# Patient Record
Sex: FEMALE | Race: BLACK OR AFRICAN AMERICAN | URBAN - METROPOLITAN AREA
[De-identification: names, ages, dates, MRNs, and addresses within clinical notes are randomized per-mention and may not be internally consistent; named-entity substitution may affect disease eponyms.]

---

## 2023-10-12 ENCOUNTER — OFFICE VISIT (OUTPATIENT)
Age: 73
End: 2023-10-12
Payer: MEDICARE

## 2023-10-12 VITALS
BODY MASS INDEX: 26.97 KG/M2 | OXYGEN SATURATION: 98 % | DIASTOLIC BLOOD PRESSURE: 72 MMHG | SYSTOLIC BLOOD PRESSURE: 119 MMHG | TEMPERATURE: 98.1 F | HEART RATE: 109 BPM | HEIGHT: 57 IN | WEIGHT: 125 LBS | RESPIRATION RATE: 18 BRPM

## 2023-10-12 DIAGNOSIS — Z86.73 HISTORY OF RECENT STROKE: ICD-10-CM

## 2023-10-12 DIAGNOSIS — F32.0 CURRENT MILD EPISODE OF MAJOR DEPRESSIVE DISORDER, UNSPECIFIED WHETHER RECURRENT (HCC): ICD-10-CM

## 2023-10-12 DIAGNOSIS — Z79.4 TYPE 2 DIABETES MELLITUS WITH DIABETIC POLYNEUROPATHY, WITH LONG-TERM CURRENT USE OF INSULIN (HCC): ICD-10-CM

## 2023-10-12 DIAGNOSIS — E11.59 TYPE 2 DIABETES MELLITUS WITH OTHER CIRCULATORY COMPLICATION, WITH LONG-TERM CURRENT USE OF INSULIN (HCC): ICD-10-CM

## 2023-10-12 DIAGNOSIS — Z76.89 ESTABLISHING CARE WITH NEW DOCTOR, ENCOUNTER FOR: Primary | ICD-10-CM

## 2023-10-12 DIAGNOSIS — I10 PRIMARY HYPERTENSION: ICD-10-CM

## 2023-10-12 DIAGNOSIS — E11.42 TYPE 2 DIABETES MELLITUS WITH DIABETIC POLYNEUROPATHY, WITH LONG-TERM CURRENT USE OF INSULIN (HCC): ICD-10-CM

## 2023-10-12 DIAGNOSIS — Z79.4 TYPE 2 DIABETES MELLITUS WITH OTHER CIRCULATORY COMPLICATION, WITH LONG-TERM CURRENT USE OF INSULIN (HCC): ICD-10-CM

## 2023-10-12 PROCEDURE — 99204 OFFICE O/P NEW MOD 45 MIN: CPT

## 2023-10-12 RX ORDER — CLOPIDOGREL BISULFATE 75 MG/1
TABLET ORAL
COMMUNITY

## 2023-10-12 RX ORDER — BUPROPION HYDROCHLORIDE 300 MG/1
TABLET ORAL
COMMUNITY

## 2023-10-12 RX ORDER — ASPIRIN 81 MG/1
TABLET ORAL
COMMUNITY

## 2023-10-12 RX ORDER — INSULIN LISPRO 100 [IU]/ML
INJECTION, SOLUTION INTRAVENOUS; SUBCUTANEOUS
COMMUNITY
Start: 2013-09-12

## 2023-10-12 RX ORDER — ATORVASTATIN CALCIUM 80 MG/1
TABLET, FILM COATED ORAL
COMMUNITY

## 2023-10-12 RX ORDER — LORAZEPAM 0.5 MG/1
TABLET ORAL
COMMUNITY
Start: 2023-01-10

## 2023-10-12 RX ORDER — CALCIUM CARBONATE 300MG(750)
TABLET,CHEWABLE ORAL
COMMUNITY

## 2023-10-12 RX ORDER — DULOXETIN HYDROCHLORIDE 60 MG/1
CAPSULE, DELAYED RELEASE ORAL
COMMUNITY

## 2023-10-12 RX ORDER — DULOXETIN HYDROCHLORIDE 30 MG/1
30 CAPSULE, DELAYED RELEASE ORAL DAILY
COMMUNITY

## 2023-10-12 RX ORDER — PRAVASTATIN SODIUM 10 MG
TABLET ORAL
COMMUNITY
End: 2023-10-14 | Stop reason: ALTCHOICE

## 2023-10-12 RX ORDER — LANCETS
EACH MISCELLANEOUS
COMMUNITY
Start: 2014-01-22

## 2023-10-12 RX ORDER — LISINOPRIL 40 MG/1
TABLET ORAL
COMMUNITY

## 2023-10-12 SDOH — ECONOMIC STABILITY: FOOD INSECURITY: WITHIN THE PAST 12 MONTHS, THE FOOD YOU BOUGHT JUST DIDN'T LAST AND YOU DIDN'T HAVE MONEY TO GET MORE.: NEVER TRUE

## 2023-10-12 SDOH — ECONOMIC STABILITY: INCOME INSECURITY: HOW HARD IS IT FOR YOU TO PAY FOR THE VERY BASICS LIKE FOOD, HOUSING, MEDICAL CARE, AND HEATING?: NOT HARD AT ALL

## 2023-10-12 SDOH — ECONOMIC STABILITY: HOUSING INSECURITY
IN THE LAST 12 MONTHS, WAS THERE A TIME WHEN YOU DID NOT HAVE A STEADY PLACE TO SLEEP OR SLEPT IN A SHELTER (INCLUDING NOW)?: NO

## 2023-10-12 SDOH — ECONOMIC STABILITY: FOOD INSECURITY: WITHIN THE PAST 12 MONTHS, YOU WORRIED THAT YOUR FOOD WOULD RUN OUT BEFORE YOU GOT MONEY TO BUY MORE.: NEVER TRUE

## 2023-10-12 ASSESSMENT — PATIENT HEALTH QUESTIONNAIRE - PHQ9
SUM OF ALL RESPONSES TO PHQ9 QUESTIONS 1 & 2: 2
SUM OF ALL RESPONSES TO PHQ QUESTIONS 1-9: 2
SUM OF ALL RESPONSES TO PHQ QUESTIONS 1-9: 2
2. FEELING DOWN, DEPRESSED OR HOPELESS: 1
SUM OF ALL RESPONSES TO PHQ QUESTIONS 1-9: 2
1. LITTLE INTEREST OR PLEASURE IN DOING THINGS: 1
SUM OF ALL RESPONSES TO PHQ QUESTIONS 1-9: 2

## 2023-12-13 ENCOUNTER — TELEPHONE (OUTPATIENT)
Age: 73
End: 2023-12-13

## 2023-12-13 NOTE — TELEPHONE ENCOUNTER
I called the patient to schedule their appointment for January but there was no answer.  I left a voicemail for the patient to call us back to schedule the next appointment.    ----- Message from Noelle Mijares sent at 10/12/2023  6:01 PM EDT -----  Regarding: 3 mos follow up  January appt with Dr. Jose Lund

## 2024-03-11 ENCOUNTER — ANESTHESIA EVENT (OUTPATIENT)
Facility: HOSPITAL | Age: 74
End: 2024-03-11
Payer: MEDICARE

## 2024-03-11 ENCOUNTER — ANESTHESIA (OUTPATIENT)
Facility: HOSPITAL | Age: 74
End: 2024-03-11
Payer: MEDICARE

## 2024-03-11 ENCOUNTER — HOSPITAL ENCOUNTER (OUTPATIENT)
Facility: HOSPITAL | Age: 74
Setting detail: OUTPATIENT SURGERY
Discharge: HOME | End: 2024-03-11
Attending: INTERNAL MEDICINE | Admitting: SPECIALIST
Payer: MEDICARE

## 2024-03-11 VITALS
HEIGHT: 59 IN | SYSTOLIC BLOOD PRESSURE: 163 MMHG | OXYGEN SATURATION: 99 % | RESPIRATION RATE: 16 BRPM | BODY MASS INDEX: 24.84 KG/M2 | HEART RATE: 71 BPM | TEMPERATURE: 98 F | WEIGHT: 123.24 LBS | DIASTOLIC BLOOD PRESSURE: 82 MMHG

## 2024-03-11 LAB
GLUCOSE BLD STRIP.AUTO-MCNC: 212 MG/DL (ref 65–117)
SERVICE CMNT-IMP: ABNORMAL

## 2024-03-11 PROCEDURE — 3600007502: Performed by: INTERNAL MEDICINE

## 2024-03-11 PROCEDURE — 3700000000 HC ANESTHESIA ATTENDED CARE: Performed by: INTERNAL MEDICINE

## 2024-03-11 PROCEDURE — 6360000002 HC RX W HCPCS: Performed by: NURSE ANESTHETIST, CERTIFIED REGISTERED

## 2024-03-11 PROCEDURE — 3700000001 HC ADD 15 MINUTES (ANESTHESIA): Performed by: INTERNAL MEDICINE

## 2024-03-11 PROCEDURE — 2580000003 HC RX 258: Performed by: NURSE ANESTHETIST, CERTIFIED REGISTERED

## 2024-03-11 PROCEDURE — 7100000010 HC PHASE II RECOVERY - FIRST 15 MIN: Performed by: INTERNAL MEDICINE

## 2024-03-11 PROCEDURE — 2709999900 HC NON-CHARGEABLE SUPPLY: Performed by: INTERNAL MEDICINE

## 2024-03-11 PROCEDURE — 7100000011 HC PHASE II RECOVERY - ADDTL 15 MIN: Performed by: INTERNAL MEDICINE

## 2024-03-11 PROCEDURE — 88305 TISSUE EXAM BY PATHOLOGIST: CPT

## 2024-03-11 PROCEDURE — 82962 GLUCOSE BLOOD TEST: CPT

## 2024-03-11 PROCEDURE — 3600007512: Performed by: INTERNAL MEDICINE

## 2024-03-11 PROCEDURE — 2500000003 HC RX 250 WO HCPCS: Performed by: NURSE ANESTHETIST, CERTIFIED REGISTERED

## 2024-03-11 RX ORDER — SODIUM CHLORIDE 0.9 % (FLUSH) 0.9 %
5-40 SYRINGE (ML) INJECTION EVERY 12 HOURS SCHEDULED
Status: DISCONTINUED | OUTPATIENT
Start: 2024-03-11 | End: 2024-03-11 | Stop reason: HOSPADM

## 2024-03-11 RX ORDER — SODIUM CHLORIDE 9 MG/ML
INJECTION, SOLUTION INTRAVENOUS CONTINUOUS PRN
Status: DISCONTINUED | OUTPATIENT
Start: 2024-03-11 | End: 2024-03-11 | Stop reason: SDUPTHER

## 2024-03-11 RX ORDER — PROPOFOL 10 MG/ML
INJECTION, EMULSION INTRAVENOUS CONTINUOUS PRN
Status: DISCONTINUED | OUTPATIENT
Start: 2024-03-11 | End: 2024-03-11 | Stop reason: SDUPTHER

## 2024-03-11 RX ORDER — LIDOCAINE HCL/PF 100 MG/5ML
SYRINGE (ML) INJECTION PRN
Status: DISCONTINUED | OUTPATIENT
Start: 2024-03-11 | End: 2024-03-11 | Stop reason: SDUPTHER

## 2024-03-11 RX ORDER — SODIUM CHLORIDE 0.9 % (FLUSH) 0.9 %
5-40 SYRINGE (ML) INJECTION PRN
Status: DISCONTINUED | OUTPATIENT
Start: 2024-03-11 | End: 2024-03-11 | Stop reason: HOSPADM

## 2024-03-11 RX ORDER — SODIUM CHLORIDE 9 MG/ML
25 INJECTION, SOLUTION INTRAVENOUS PRN
Status: DISCONTINUED | OUTPATIENT
Start: 2024-03-11 | End: 2024-03-11 | Stop reason: HOSPADM

## 2024-03-11 RX ORDER — ONDANSETRON 2 MG/ML
4 INJECTION INTRAMUSCULAR; INTRAVENOUS EVERY 6 HOURS PRN
Status: DISCONTINUED | OUTPATIENT
Start: 2024-03-11 | End: 2024-03-11 | Stop reason: HOSPADM

## 2024-03-11 RX ORDER — SODIUM CHLORIDE 9 MG/ML
INJECTION, SOLUTION INTRAVENOUS PRN
Status: DISCONTINUED | OUTPATIENT
Start: 2024-03-11 | End: 2024-03-11 | Stop reason: HOSPADM

## 2024-03-11 RX ORDER — ONDANSETRON 4 MG/1
4 TABLET, ORALLY DISINTEGRATING ORAL EVERY 8 HOURS PRN
Status: DISCONTINUED | OUTPATIENT
Start: 2024-03-11 | End: 2024-03-11 | Stop reason: HOSPADM

## 2024-03-11 RX ADMIN — PROPOFOL 70 MG: 10 INJECTION, EMULSION INTRAVENOUS at 07:42

## 2024-03-11 RX ADMIN — SODIUM CHLORIDE: 9 INJECTION, SOLUTION INTRAVENOUS at 07:38

## 2024-03-11 RX ADMIN — PROPOFOL 120 MCG/KG/MIN: 10 INJECTION, EMULSION INTRAVENOUS at 07:43

## 2024-03-11 RX ADMIN — LIDOCAINE HYDROCHLORIDE 40 MG: 20 INJECTION INTRAVENOUS at 07:42

## 2024-03-11 RX ADMIN — LIDOCAINE HYDROCHLORIDE 40 MG: 20 INJECTION, SOLUTION INFILTRATION; PERINEURAL at 07:43

## 2024-03-11 ASSESSMENT — PAIN - FUNCTIONAL ASSESSMENT: PAIN_FUNCTIONAL_ASSESSMENT: 0-10

## 2024-03-11 NOTE — ANESTHESIA PRE PROCEDURE
facility-administered medications for this encounter.       Allergies:    Allergies   Allergen Reactions    Spironolactone Swelling     Mouth swelling    Elemental Sulfur Rash       Problem List:  There is no problem list on file for this patient.      Past Medical History:        Diagnosis Date    Cerebral artery occlusion with cerebral infarction (HCC)     Diabetes mellitus (HCC)     Hypertension        Past Surgical History:        Procedure Laterality Date     SECTION      PARTIAL HYSTERECTOMY (CERVIX NOT REMOVED)         Social History:    Social History     Tobacco Use    Smoking status: Never    Smokeless tobacco: Never   Substance Use Topics    Alcohol use: Never                                Counseling given: Not Answered      Vital Signs (Current):   Vitals:    24 0642   BP: (!) 170/68   Pulse: 71   Resp: 16   Temp: 98.2 °F (36.8 °C)   TempSrc: Oral   SpO2: 97%   Weight: 55.9 kg (123 lb 3.8 oz)   Height: 1.499 m (4' 11\")                                              BP Readings from Last 3 Encounters:   24 (!) 170/68   10/12/23 119/72       NPO Status: Time of last liquid consumption: 1900                        Time of last solid consumption: 1600                        Date of last liquid consumption: 03/10/24                        Date of last solid food consumption: 24    BMI:   Wt Readings from Last 3 Encounters:   24 55.9 kg (123 lb 3.8 oz)   10/12/23 56.7 kg (125 lb)     Body mass index is 24.89 kg/m².    CBC: No results found for: \"WBC\", \"RBC\", \"HGB\", \"HCT\", \"MCV\", \"RDW\", \"PLT\"    CMP: No results found for: \"NA\", \"K\", \"CL\", \"CO2\", \"BUN\", \"CREATININE\", \"GFRAA\", \"AGRATIO\", \"LABGLOM\", \"GLUCOSE\", \"GLU\", \"PROT\", \"CALCIUM\", \"BILITOT\", \"ALKPHOS\", \"AST\", \"ALT\"    POC Tests:   Recent Labs     24  0640   POCGLU 212*       Coags: No results found for: \"PROTIME\", \"INR\", \"APTT\"    HCG (If Applicable): No results found for: \"PREGTESTUR\", \"PREGSERUM\", \"HCG\", \"HCGQUANT\"

## 2024-03-11 NOTE — OP NOTE
.                       [facility]  Formerly [facility]  Pollo Ellison MD  [phone]      2024    Colonoscopy Procedure Note  Crystal Carrillo  :  1950  [facility] Medical Record Number: [id number]    Indications:   Polyp surveillance, current constipation, last colonoscopy 5 years ago  PCP:  Tammy Sifuentes MD  Anesthesia/Sedation: Monitored anesthesia care, see separate note  Endoscopist:  Pollo Ellison MD   Complications:  None  Estimated Blood Loss:  None    Permit:  The indications, risks, benefits and alternatives were reviewed with the patient or their decision maker who was provided an opportunity to ask questions and all questions were answered.  The specific risks of colonoscopy with conscious sedation were reviewed, including but not limited to anesthetic complication, bleeding, adverse drug reaction, missed lesion, infection, IV site reactions, and intestinal perforation which would lead to the need for surgical repair.  Alternatives to colonoscopy including radiographic imaging, observation without testing, or laboratory testing were reviewed including the limitations of those alternatives.  After considering the options and having all their questions answered, the patient or their decision maker provided both verbal and written consent to proceed.        Procedure in Detail:  After obtaining informed consent, positioning of the patient in the left lateral decubitus position, and conduction of a pre-procedure pause or \"time out\" the endoscope was introduced into the anus and advanced to the cerum.  The quality of the colonic preparation was fair.  A careful inspection was made as the colonoscope was withdrawn, findings and interventions are described below.  After cleaning the colon, the [place] Bowel Prep score:   Right colon-2  Transverse colon-2, left colon-2  Total [place] bowel score of 6, fair prep    Findings:   Digital rectal

## 2024-03-11 NOTE — PROGRESS NOTES
Endoscopy discharge instructions have been reviewed and given to patient.  The patient verbalized understanding and acceptance of instructions.      Dr. Palomo discussed with spouse procedure findings and next steps.

## 2024-03-11 NOTE — H&P
.Pre-Endoscopy H&P Update  Chief complaint/HPI/ROS:  The indication for the procedure, the patient's history and the patient's current medications are reviewed prior to the procedure and that data is reported on the H&P to which this document is attached.  Any significant complaints with regard to organ systems will be noted, and if not mentioned then a review of systems is not contributory.  Past Medical History:   Diagnosis Date    Cerebral artery occlusion with cerebral infarction (HCC)     Diabetes mellitus (HCC)     Hypertension       Past Surgical History:   Procedure Laterality Date     SECTION      PARTIAL HYSTERECTOMY (CERVIX NOT REMOVED)       Social   Social History     Tobacco Use    Smoking status: Never    Smokeless tobacco: Never   Substance Use Topics    Alcohol use: Never      History reviewed. No pertinent family history.   Allergies   Allergen Reactions    Spironolactone Swelling     Mouth swelling    Elemental Sulfur Rash      Prior to Admission Medications   Prescriptions Last Dose Informant Patient Reported? Taking?   Cholecalciferol (VITAMIN D3) 25 MCG (1000 UT) CHEW Past Month  Yes No   Sig: Take by mouth   DULoxetine (CYMBALTA) 30 MG extended release capsule 3/8/2024  Yes No   Sig: Take 1 capsule by mouth daily   DULoxetine (CYMBALTA) 60 MG extended release capsule 3/8/2024  Yes No   Si MG  , TAKE 1 CAPSULE DAILY   LORazepam (ATIVAN) 0.5 MG tablet Past Month  Yes No   Sig: Take by mouth.   ONE TOUCH CLUB LANCETS MISC 3/10/2024  Yes No   Sig: Not Applicable  TEST THREE TIMES A DAY, as directed   Semaglutide (OZEMPIC, 1 MG/DOSE, SC) 3/3/2024  Yes No   Sig: Inject into the skin   aspirin 81 MG EC tablet Not Taking  Yes No   Sig: Take by mouth   Patient not taking: Reported on 10/12/2023   atorvastatin (LIPITOR) 80 MG tablet 3/8/2024  Yes No   Sig: Take by mouth   blood glucose test strips (ASCENSIA AUTODISC VI;ONE TOUCH ULTRA TEST VI) strip 3/10/2024  Yes No   Si each by In

## 2024-03-11 NOTE — ANESTHESIA POSTPROCEDURE EVALUATION
Department of Anesthesiology  Postprocedure Note    Patient: Crystal Carrillo  MRN: [id number]  YOB: 1950  Date of evaluation: 3/11/2024    Procedure Summary       Date: 03/11/24 Room / Location: [facility] ENDO 02 / [facility] ENDOSCOPY    Anesthesia Start: 0738 Anesthesia Stop: 0802    Procedures:       COLONOSCOPY (Lower GI Region)      COLONOSCOPY POLYPECTOMY SNARE/COLD BIOPSY (Lower GI Region) Diagnosis:       Constipation, unspecified constipation type      Personal history of colonic polyps      Right upper quadrant pain      Colon cancer screening      Steatosis of liver      Tubular adenoma of colon      (Constipation, unspecified constipation type [K59.00])      (Personal history of colonic polyps [Z86.010])      (Right upper quadrant pain [R10.11])      (Colon cancer screening [Z12.11])      (Steatosis of liver [K76.0])      (Tubular adenoma of colon [D12.6])    Surgeons: Pollo Ellison MD Responsible Provider: Shaun Kwok MD    Anesthesia Type: MAC ASA Status: 3            Anesthesia Type: MAC    Sami Phase I: Sami Score: 10    Sami Phase II: Sami Score: 10    Anesthesia Post Evaluation    Patient location during evaluation: PACU  Patient participation: complete - patient participated  Level of consciousness: awake  Pain score: 0  Airway patency: patent  Nausea & Vomiting: no nausea and no vomiting  Cardiovascular status: blood pressure returned to baseline  Respiratory status: acceptable  Hydration status: euvolemic  Multimodal analgesia pain management approach  Pain management: adequate    No notable events documented.

## 2024-03-11 NOTE — DISCHARGE INSTRUCTIONS
size  Diminutive rectal polyps    Recommendations:   Repeat colonoscopy in 1 years time with extended day bowel preparation such as MiraLAX 17 g twice daily for 7 days followed by GoLytely 4 L bowel prep to ensure adequate bowel preparation preprocedure  Avoid aspirin doses greater than 81 mg and other NSAID medications for the next 7 days  Resume Plavix in 48 hours.  Resume other preprocedure medications today without interruption  Resume preprocedure diet  We will contact you by patient later in 7-14 business days regarding pathology results.  Please contact our [facility] office with additional questions or concerns at [phone].     It was an honor to be your doctor today.  Please let me or my office staff know if you have any feedback about today's procedure.    Pollo Ellison MD    Colonoscopy saves lives, and can prevent colon cancer.  Everyone aged 50 or older needs colonoscopy.  Tell your family and friends: get the test!

## 2024-03-11 NOTE — PERIOP NOTE
Received recovery report from anesthesia team, see anesthesia note. Abdomen remains soft and non-tender post-procedure. Pt has no complaints at this time and tolerated procedure well. Endoscope was pre-cleaned at the bedside by Sree Miguel immediately following procedure. Post recovery report given to Gertrudis PACU CAITIE.

## 2024-03-11 NOTE — PROGRESS NOTES
Crystal Carrillo  1950  [id number]    Situation:  Verbal report received from: Lana BLOOD  Procedure: Procedure(s):  COLONOSCOPY  COLONOSCOPY POLYPECTOMY SNARE/COLD BIOPSY    Background:    Preoperative diagnosis: Constipation, unspecified constipation type [K59.00]  Personal history of colonic polyps [Z86.010]  Right upper quadrant pain [R10.11]  Colon cancer screening [Z12.11]  Steatosis of liver [K76.0]  Tubular adenoma of colon [D12.6]  Postoperative diagnosis: * No post-op diagnosis entered *    :  Dr. Palomo  Assistant(s): Circulator: Carmen Baptiste, RN  Circulator Assist: Aileen Babb, RN  Endoscopy Technician: Sekou Miguel    Specimens:   ID Type Source Tests Collected by Time Destination   1 : ascending colon polyps (2) Tissue Colon-Ascending SURGICAL PATHOLOGY Pollo Ellison MD 3/11/2024 0749    2 : rectal polyp Tissue Rectum SURGICAL PATHOLOGY Pollo Ellison MD 3/11/2024 0757      H. Pylori  No    Assessment:  Intra-procedure medications   Anesthesia gave intra-procedure sedation and medications, see anesthesia flow sheet Yes    Intravenous fluids: NS@ KVO     Vital signs stable yes    Abdominal assessment: round and soft yes    Recommendation:  Discharge patient per MD order yes.  Family or Friend =   Permission to share finding with family or friend Yes

## 2024-04-10 ENCOUNTER — OFFICE VISIT (OUTPATIENT)
Age: 74
End: 2024-04-10
Payer: MEDICARE

## 2024-04-10 VITALS
TEMPERATURE: 98.7 F | SYSTOLIC BLOOD PRESSURE: 169 MMHG | WEIGHT: 126 LBS | DIASTOLIC BLOOD PRESSURE: 76 MMHG | BODY MASS INDEX: 25.4 KG/M2 | HEIGHT: 59 IN | HEART RATE: 69 BPM | RESPIRATION RATE: 18 BRPM | OXYGEN SATURATION: 98 %

## 2024-04-10 DIAGNOSIS — E11.42 TYPE 2 DIABETES MELLITUS WITH DIABETIC POLYNEUROPATHY, WITH LONG-TERM CURRENT USE OF INSULIN (HCC): ICD-10-CM

## 2024-04-10 DIAGNOSIS — E11.59 HYPERTENSION ASSOCIATED WITH DIABETES (HCC): Primary | ICD-10-CM

## 2024-04-10 DIAGNOSIS — E11.59 TYPE 2 DIABETES MELLITUS WITH OTHER CIRCULATORY COMPLICATION, WITH LONG-TERM CURRENT USE OF INSULIN (HCC): ICD-10-CM

## 2024-04-10 DIAGNOSIS — Z86.73 HISTORY OF RECENT STROKE: ICD-10-CM

## 2024-04-10 DIAGNOSIS — Z79.4 TYPE 2 DIABETES MELLITUS WITH DIABETIC POLYNEUROPATHY, WITH LONG-TERM CURRENT USE OF INSULIN (HCC): ICD-10-CM

## 2024-04-10 DIAGNOSIS — Z79.4 TYPE 2 DIABETES MELLITUS WITH OTHER CIRCULATORY COMPLICATION, WITH LONG-TERM CURRENT USE OF INSULIN (HCC): ICD-10-CM

## 2024-04-10 DIAGNOSIS — I15.2 HYPERTENSION ASSOCIATED WITH DIABETES (HCC): Primary | ICD-10-CM

## 2024-04-10 LAB
CREAT UR-MCNC: 55.7 MG/DL
MICROALBUMIN UR-MCNC: 19.7 MG/DL
MICROALBUMIN/CREAT UR-RTO: 354 MG/G (ref 0–30)

## 2024-04-10 PROCEDURE — 1036F TOBACCO NON-USER: CPT

## 2024-04-10 PROCEDURE — 99214 OFFICE O/P EST MOD 30 MIN: CPT

## 2024-04-10 PROCEDURE — G8400 PT W/DXA NO RESULTS DOC: HCPCS

## 2024-04-10 PROCEDURE — G8419 CALC BMI OUT NRM PARAM NOF/U: HCPCS

## 2024-04-10 PROCEDURE — G8427 DOCREV CUR MEDS BY ELIG CLIN: HCPCS

## 2024-04-10 PROCEDURE — 3078F DIAST BP <80 MM HG: CPT

## 2024-04-10 PROCEDURE — 1090F PRES/ABSN URINE INCON ASSESS: CPT

## 2024-04-10 PROCEDURE — 3017F COLORECTAL CA SCREEN DOC REV: CPT

## 2024-04-10 PROCEDURE — 2022F DILAT RTA XM EVC RTNOPTHY: CPT

## 2024-04-10 PROCEDURE — 1123F ACP DISCUSS/DSCN MKR DOCD: CPT

## 2024-04-10 PROCEDURE — 3046F HEMOGLOBIN A1C LEVEL >9.0%: CPT

## 2024-04-10 PROCEDURE — 3077F SYST BP >= 140 MM HG: CPT

## 2024-04-10 RX ORDER — AMLODIPINE BESYLATE 5 MG/1
5 TABLET ORAL DAILY
Qty: 90 TABLET | Refills: 0 | Status: SHIPPED | OUTPATIENT
Start: 2024-04-10

## 2024-04-10 RX ORDER — INSULIN GLARGINE 300 U/ML
40 INJECTION, SOLUTION SUBCUTANEOUS DAILY
Qty: 10 ADJUSTABLE DOSE PRE-FILLED PEN SYRINGE | Refills: 3 | Status: SHIPPED | OUTPATIENT
Start: 2024-04-10

## 2024-04-10 RX ORDER — INSULIN GLARGINE 300 U/ML
30 INJECTION, SOLUTION SUBCUTANEOUS DAILY
COMMUNITY
End: 2024-04-10 | Stop reason: DRUGHIGH

## 2024-04-10 RX ORDER — GABAPENTIN 100 MG/1
100 CAPSULE ORAL NIGHTLY
COMMUNITY

## 2024-04-10 ASSESSMENT — PATIENT HEALTH QUESTIONNAIRE - PHQ9
9. THOUGHTS THAT YOU WOULD BE BETTER OFF DEAD, OR OF HURTING YOURSELF: NEARLY EVERY DAY
1. LITTLE INTEREST OR PLEASURE IN DOING THINGS: NEARLY EVERY DAY
3. TROUBLE FALLING OR STAYING ASLEEP: MORE THAN HALF THE DAYS
4. FEELING TIRED OR HAVING LITTLE ENERGY: SEVERAL DAYS
6. FEELING BAD ABOUT YOURSELF - OR THAT YOU ARE A FAILURE OR HAVE LET YOURSELF OR YOUR FAMILY DOWN: MORE THAN HALF THE DAYS
5. POOR APPETITE OR OVEREATING: MORE THAN HALF THE DAYS
SUM OF ALL RESPONSES TO PHQ QUESTIONS 1-9: 17
10. IF YOU CHECKED OFF ANY PROBLEMS, HOW DIFFICULT HAVE THESE PROBLEMS MADE IT FOR YOU TO DO YOUR WORK, TAKE CARE OF THINGS AT HOME, OR GET ALONG WITH OTHER PEOPLE: NOT DIFFICULT AT ALL
SUM OF ALL RESPONSES TO PHQ QUESTIONS 1-9: 14
SUM OF ALL RESPONSES TO PHQ QUESTIONS 1-9: 17
2. FEELING DOWN, DEPRESSED OR HOPELESS: NEARLY EVERY DAY
7. TROUBLE CONCENTRATING ON THINGS, SUCH AS READING THE NEWSPAPER OR WATCHING TELEVISION: NOT AT ALL
SUM OF ALL RESPONSES TO PHQ QUESTIONS 1-9: 17
8. MOVING OR SPEAKING SO SLOWLY THAT OTHER PEOPLE COULD HAVE NOTICED. OR THE OPPOSITE, BEING SO FIGETY OR RESTLESS THAT YOU HAVE BEEN MOVING AROUND A LOT MORE THAN USUAL: SEVERAL DAYS
SUM OF ALL RESPONSES TO PHQ9 QUESTIONS 1 & 2: 6

## 2024-04-10 ASSESSMENT — ANXIETY QUESTIONNAIRES
2. NOT BEING ABLE TO STOP OR CONTROL WORRYING: SEVERAL DAYS
1. FEELING NERVOUS, ANXIOUS, OR ON EDGE: NOT AT ALL
IF YOU CHECKED OFF ANY PROBLEMS ON THIS QUESTIONNAIRE, HOW DIFFICULT HAVE THESE PROBLEMS MADE IT FOR YOU TO DO YOUR WORK, TAKE CARE OF THINGS AT HOME, OR GET ALONG WITH OTHER PEOPLE: NOT DIFFICULT AT ALL
GAD7 TOTAL SCORE: 6
6. BECOMING EASILY ANNOYED OR IRRITABLE: NOT AT ALL
4. TROUBLE RELAXING: SEVERAL DAYS
3. WORRYING TOO MUCH ABOUT DIFFERENT THINGS: NEARLY EVERY DAY
5. BEING SO RESTLESS THAT IT IS HARD TO SIT STILL: NOT AT ALL
7. FEELING AFRAID AS IF SOMETHING AWFUL MIGHT HAPPEN: SEVERAL DAYS

## 2024-04-10 NOTE — PROGRESS NOTES
I reviewed with the resident the medical history and the resident's findings on the physical examination.  I discussed with the resident the patient's diagnosis and concur with the plan.      Follow up for hypertension and diabetes, PMH significant for hx CVA. Specialists include endocrinology, but no follow up scheduled currently.   Increase Toujeo to 40 units (was previously on 60) and a titration scale of 2 units every three days for fasting glucose >140. CGM in place and will have glucose tablets and glucagon. Lives with daughter who is a nurse.   Add amlodipine for blood pressure control.   Follow up in 2 weeks.

## 2024-04-10 NOTE — PROGRESS NOTES
Patient has been identified by name and .    Chief Complaint   Patient presents with    Heat Exposure     Pt reports for stroke F/U, no other concerns today.       Vitals:    04/10/24 0822 04/10/24 0836   BP: (!) 176/69 (!) 175/89   Site: Right Upper Arm Left Upper Arm   Position: Sitting Sitting   Cuff Size: Medium Adult Medium Adult   Pulse: 69    Resp: 18    Temp: 98.7 °F (37.1 °C)    TempSrc: Oral    SpO2: 98%    Weight: 57.2 kg (126 lb)    Height: 1.499 m (4' 11\")         \"Have you been to the ER, urgent care clinic since your last visit?  Hospitalized since your last visit?\"    NO    “Have you seen or consulted any other health care providers outside of [facility] since your last visit?”    NO       Have you had a mammogram?”   YES - Where: From [facility], normal result , Record releasr form signed  No breast cancer screening on file

## 2024-04-10 NOTE — PROGRESS NOTES
Chief Complaint   Patient presents with    Heat Exposure     Pt reports for stroke F/U, no other concerns today.      Subjective   Crystal Carrillo is an 73 y.o. female who presents for a follow up on hypertension    Hypertension   On 40mg lisinopril. Does not check BP at home. BP was elevated a month ago at the gastroenterologist. Denies chest pain, palpitations, shortness of breath, pedal edema.     DM  On jardiance 25mg daily, Toujeo 30 units daily, Ozempic 1mg weekly, Humalog SSI. F/w Endocrinology Dr. Obinna Hu. Was initially on 60 units of Toujeo daily this was reduced to 30 units during her hospitalization in 09/23 for a CVA   Reviewed kinjal 2 log shows FBG ranging from 170 - 260. Complains of frequency, denies dysuria, chills, fever, abdominal pain.    Review of Systems   Review of Systems     Allergies   Allergies   Allergen Reactions    Spironolactone Swelling     Mouth swelling    Elemental Sulfur Rash       Medications  Current Outpatient Medications   Medication Sig    gabapentin (NEURONTIN) 100 MG capsule Take 1 capsule by mouth at bedtime. Max Daily Amount: 100 mg    amLODIPine (NORVASC) 5 MG tablet Take 1 tablet by mouth daily    Insulin Glargine, 2 Unit Dial, (TOUJEO MAX SOLOSTAR) 300 UNIT/ML SOPN Inject 40 Units into the skin daily    glucagon 1 MG injection Inject 1 mg into the muscle See Admin Instructions Follow package directions for low blood sugar.    atorvastatin (LIPITOR) 80 MG tablet Take by mouth    buPROPion (WELLBUTRIN XL) 300 MG extended release tablet 300 MG  , TAKE 1 TABLET DAILY IN THE MORNING    clopidogrel (PLAVIX) 75 MG tablet Take by mouth    DULoxetine (CYMBALTA) 60 MG extended release capsule 60 MG  , TAKE 1 CAPSULE DAILY    insulin lispro, 1 Unit Dial, (HUMALOG KWIKPEN) 100 UNIT/ML SOPN Inject into the skin    empagliflozin (JARDIANCE) 25 MG tablet Take 1 tablet by mouth    lisinopril (PRINIVIL;ZESTRIL) 40 MG tablet 40 MG  , TAKE 1 TABLET DAILY    LORazepam (ATIVAN) 0.5

## 2024-04-11 LAB
ALBUMIN SERPL-MCNC: 3.8 G/DL (ref 3.5–5)
ALBUMIN/GLOB SERPL: 1.2 (ref 1.1–2.2)
ALP SERPL-CCNC: 103 U/L (ref 45–117)
ALT SERPL-CCNC: 28 U/L (ref 12–78)
ANION GAP SERPL CALC-SCNC: 4 MMOL/L (ref 5–15)
AST SERPL-CCNC: 19 U/L (ref 15–37)
BILIRUB SERPL-MCNC: 0.3 MG/DL (ref 0.2–1)
BUN SERPL-MCNC: 16 MG/DL (ref 6–20)
BUN/CREAT SERPL: 20 (ref 12–20)
CALCIUM SERPL-MCNC: 10.5 MG/DL (ref 8.5–10.1)
CHLORIDE SERPL-SCNC: 110 MMOL/L (ref 97–108)
CHOLEST SERPL-MCNC: 151 MG/DL
CO2 SERPL-SCNC: 29 MMOL/L (ref 21–32)
CREAT SERPL-MCNC: 0.79 MG/DL (ref 0.55–1.02)
ERYTHROCYTE [DISTWIDTH] IN BLOOD BY AUTOMATED COUNT: 13.2 % (ref 11.5–14.5)
EST. AVERAGE GLUCOSE BLD GHB EST-MCNC: 217 MG/DL
GLOBULIN SER CALC-MCNC: 3.2 G/DL (ref 2–4)
GLUCOSE SERPL-MCNC: 295 MG/DL (ref 65–100)
HBA1C MFR BLD: 9.2 % (ref 4–5.6)
HCT VFR BLD AUTO: 37.9 % (ref 35–47)
HDLC SERPL-MCNC: 89 MG/DL
HDLC SERPL: 1.7 (ref 0–5)
HGB BLD-MCNC: 12.7 G/DL (ref 11.5–16)
LDLC SERPL CALC-MCNC: 53.6 MG/DL (ref 0–100)
MCH RBC QN AUTO: 30.6 PG (ref 26–34)
MCHC RBC AUTO-ENTMCNC: 33.5 G/DL (ref 30–36.5)
MCV RBC AUTO: 91.3 FL (ref 80–99)
NRBC # BLD: 0 K/UL (ref 0–0.01)
NRBC BLD-RTO: 0 PER 100 WBC
PLATELET # BLD AUTO: 286 K/UL (ref 150–400)
PMV BLD AUTO: 9.5 FL (ref 8.9–12.9)
POTASSIUM SERPL-SCNC: 4.3 MMOL/L (ref 3.5–5.1)
PROT SERPL-MCNC: 7 G/DL (ref 6.4–8.2)
RBC # BLD AUTO: 4.15 M/UL (ref 3.8–5.2)
SODIUM SERPL-SCNC: 143 MMOL/L (ref 136–145)
TRIGL SERPL-MCNC: 42 MG/DL
VLDLC SERPL CALC-MCNC: 8.4 MG/DL
WBC # BLD AUTO: 5.4 K/UL (ref 3.6–11)

## 2024-04-15 DIAGNOSIS — Z79.4 TYPE 2 DIABETES MELLITUS WITH DIABETIC POLYNEUROPATHY, WITH LONG-TERM CURRENT USE OF INSULIN (HCC): ICD-10-CM

## 2024-04-15 DIAGNOSIS — Z79.4 TYPE 2 DIABETES MELLITUS WITH OTHER CIRCULATORY COMPLICATION, WITH LONG-TERM CURRENT USE OF INSULIN (HCC): ICD-10-CM

## 2024-04-15 DIAGNOSIS — E11.59 TYPE 2 DIABETES MELLITUS WITH OTHER CIRCULATORY COMPLICATION, WITH LONG-TERM CURRENT USE OF INSULIN (HCC): ICD-10-CM

## 2024-04-15 DIAGNOSIS — E11.42 TYPE 2 DIABETES MELLITUS WITH DIABETIC POLYNEUROPATHY, WITH LONG-TERM CURRENT USE OF INSULIN (HCC): ICD-10-CM

## 2024-04-15 NOTE — TELEPHONE ENCOUNTER
[facility] is requesting to clarify the DRUG NAME AND STRENGTH for the pended medicine and also to include direction and quantity for it.    LOV: 04/10/2024

## 2024-04-18 NOTE — TELEPHONE ENCOUNTER
[facility] called stating that they were needing to know the name and strength for the glucose tablets prescription they received. They stated that they were unable to find the 15 g strength. If need be [employer] can be contacted at [phone] and the reference number is [id number].    Thanks!

## 2024-04-19 NOTE — TELEPHONE ENCOUNTER
A rep from [facility] called again in regards to needing the prescription for the glucose tablets clarified. They are needing to know the name and the strength of the medication. The rep stated that they can't ship out any medications until they hear from the doctor. Can be contacted at [phone] and the reference number us [id number].    Thanks!

## 2024-04-30 ENCOUNTER — OFFICE VISIT (OUTPATIENT)
Age: 74
End: 2024-04-30
Payer: MEDICARE

## 2024-04-30 VITALS
SYSTOLIC BLOOD PRESSURE: 146 MMHG | HEIGHT: 59 IN | TEMPERATURE: 97.8 F | OXYGEN SATURATION: 98 % | BODY MASS INDEX: 24.76 KG/M2 | RESPIRATION RATE: 18 BRPM | DIASTOLIC BLOOD PRESSURE: 77 MMHG | WEIGHT: 122.8 LBS | HEART RATE: 69 BPM

## 2024-04-30 DIAGNOSIS — E11.649 HYPOGLYCEMIA ASSOCIATED WITH TYPE 2 DIABETES MELLITUS (HCC): ICD-10-CM

## 2024-04-30 DIAGNOSIS — Z79.4 TYPE 2 DIABETES MELLITUS WITH OTHER CIRCULATORY COMPLICATION, WITH LONG-TERM CURRENT USE OF INSULIN (HCC): ICD-10-CM

## 2024-04-30 DIAGNOSIS — E11.59 HYPERTENSION ASSOCIATED WITH DIABETES (HCC): ICD-10-CM

## 2024-04-30 DIAGNOSIS — E11.59 TYPE 2 DIABETES MELLITUS WITH OTHER CIRCULATORY COMPLICATION, WITH LONG-TERM CURRENT USE OF INSULIN (HCC): ICD-10-CM

## 2024-04-30 DIAGNOSIS — F32.0 CURRENT MILD EPISODE OF MAJOR DEPRESSIVE DISORDER, UNSPECIFIED WHETHER RECURRENT (HCC): ICD-10-CM

## 2024-04-30 DIAGNOSIS — I15.2 HYPERTENSION ASSOCIATED WITH DIABETES (HCC): ICD-10-CM

## 2024-04-30 DIAGNOSIS — Z79.4 TYPE 2 DIABETES MELLITUS WITH DIABETIC POLYNEUROPATHY, WITH LONG-TERM CURRENT USE OF INSULIN (HCC): Primary | ICD-10-CM

## 2024-04-30 DIAGNOSIS — E11.42 TYPE 2 DIABETES MELLITUS WITH DIABETIC POLYNEUROPATHY, WITH LONG-TERM CURRENT USE OF INSULIN (HCC): Primary | ICD-10-CM

## 2024-04-30 PROCEDURE — 1036F TOBACCO NON-USER: CPT

## 2024-04-30 PROCEDURE — G8420 CALC BMI NORM PARAMETERS: HCPCS

## 2024-04-30 PROCEDURE — 3078F DIAST BP <80 MM HG: CPT

## 2024-04-30 PROCEDURE — G8400 PT W/DXA NO RESULTS DOC: HCPCS

## 2024-04-30 PROCEDURE — 99214 OFFICE O/P EST MOD 30 MIN: CPT

## 2024-04-30 PROCEDURE — 3046F HEMOGLOBIN A1C LEVEL >9.0%: CPT

## 2024-04-30 PROCEDURE — G8427 DOCREV CUR MEDS BY ELIG CLIN: HCPCS

## 2024-04-30 PROCEDURE — 3017F COLORECTAL CA SCREEN DOC REV: CPT

## 2024-04-30 PROCEDURE — 3077F SYST BP >= 140 MM HG: CPT

## 2024-04-30 PROCEDURE — 2022F DILAT RTA XM EVC RTNOPTHY: CPT

## 2024-04-30 PROCEDURE — 1123F ACP DISCUSS/DSCN MKR DOCD: CPT

## 2024-04-30 PROCEDURE — 1090F PRES/ABSN URINE INCON ASSESS: CPT

## 2024-04-30 RX ORDER — DULOXETIN HYDROCHLORIDE 30 MG/1
30 CAPSULE, DELAYED RELEASE ORAL DAILY
Qty: 90 CAPSULE | Refills: 3 | Status: SHIPPED | OUTPATIENT
Start: 2024-04-30

## 2024-04-30 RX ORDER — DULOXETIN HYDROCHLORIDE 60 MG/1
CAPSULE, DELAYED RELEASE ORAL
Qty: 90 CAPSULE | Refills: 3 | Status: SHIPPED | OUTPATIENT
Start: 2024-04-30

## 2024-04-30 NOTE — PROGRESS NOTES
Patient has been identified by name and .    Chief Complaint   Patient presents with    Hypertension     Pt reports for F/U on HTN, No other concerns today.       Vitals:    24 1550 24 1601   BP: (!) 147/70 (!) 146/77   Site: Left Upper Arm Right Upper Arm   Position: Sitting Sitting   Cuff Size: Medium Adult Medium Adult   Pulse: 65 69   Resp: 18    Temp: 97.8 °F (36.6 °C)    TempSrc: Oral    SpO2: 98%    Weight: 55.7 kg (122 lb 12.8 oz)    Height: 1.499 m (4' 11\")         \"Have you been to the ER, urgent care clinic since your last visit?  Hospitalized since your last visit?\"    NO    “Have you seen or consulted any other health care providers outside of [facility] since your last visit?”    NO       Have you had a mammogram?”   NO    No breast cancer screening on file

## 2024-04-30 NOTE — PROGRESS NOTES
Chief Complaint   Patient presents with    Hypertension     Pt reports for F/U on HTN, No other concerns today.      Subjective   Crystal Carrillo is an 73 y.o. female who presents for follow up on diabetes and hypertension    T2DM  On jardiance 25mg daily, Toujeo 60 units daily, Ozempic 1mg weekly, and humalog 10 units BIDAC. Blood glucose log shows well controlled fasting blood glucose, however has episodes of hypoglycemia in the evening and overnight . She has recently changed her diet and is eating less d/t reduced appetite    HTN  On lisinopril 40mg daily, stopped taking the amlodipine after 2 days because of an episode of passing out which she attributed to the amlodipine. Has not been checking her blood pressure at home.   Denies chest pain , palpitations, SOB, headache, pedal edema      Review of Systems   Review of Systems     Allergies   Allergies   Allergen Reactions    Spironolactone Swelling     Mouth swelling    Elemental Sulfur Rash       Medications  Current Outpatient Medications   Medication Sig    DULoxetine (CYMBALTA) 60 MG extended release capsule 60 MG  , TAKE 1 CAPSULE DAILY    DULoxetine (CYMBALTA) 30 MG extended release capsule Take 1 capsule by mouth daily    gabapentin (NEURONTIN) 100 MG capsule Take 1 capsule by mouth at bedtime.    Insulin Glargine, 2 Unit Dial, (TOUJEO MAX SOLOSTAR) 300 UNIT/ML SOPN Inject 40 Units into the skin daily    atorvastatin (LIPITOR) 80 MG tablet Take by mouth    buPROPion (WELLBUTRIN XL) 300 MG extended release tablet 300 MG  , TAKE 1 TABLET DAILY IN THE MORNING    clopidogrel (PLAVIX) 75 MG tablet Take by mouth    insulin lispro, 1 Unit Dial, (HUMALOG KWIKPEN) 100 UNIT/ML SOPN Inject into the skin    empagliflozin (JARDIANCE) 25 MG tablet Take 1 tablet by mouth    lisinopril (PRINIVIL;ZESTRIL) 40 MG tablet 40 MG  , TAKE 1 TABLET DAILY    LORazepam (ATIVAN) 0.5 MG tablet Take by mouth.    Semaglutide (OZEMPIC, 1 MG/DOSE, SC) Inject into the skin

## 2024-04-30 NOTE — PATIENT INSTRUCTIONS
-Take 1/2 tablet of amlodipine daily for a week, If you are tolerating this dose go up to one tablet a day   - Reduce the amount of Humalog you are taking to 5 units twice daily with meals, this is because your blood sugar is dropping too low on your current dose  - Reduce Toujeo to 50 units daily  - Continue taking your other prescribed medication  - Get a blood pressure cuff and log your blood pressure daily   - Schedule an appointment with your endocrinologist

## 2024-07-01 ENCOUNTER — TELEPHONE (OUTPATIENT)
Age: 74
End: 2024-07-01

## 2024-07-01 NOTE — TELEPHONE ENCOUNTER
Pt's most recent OV and the  most recent labs were faxed to [phone] upon receiving a fax request from Dr. Busby, [facility] for the Pt.

## 2024-07-11 ENCOUNTER — OFFICE VISIT (OUTPATIENT)
Age: 74
End: 2024-07-11
Payer: MEDICARE

## 2024-07-11 ENCOUNTER — INITIAL CONSULT (OUTPATIENT)
Age: 74
End: 2024-07-11

## 2024-07-11 VITALS
WEIGHT: 128 LBS | TEMPERATURE: 98.1 F | HEIGHT: 59 IN | SYSTOLIC BLOOD PRESSURE: 146 MMHG | OXYGEN SATURATION: 95 % | HEART RATE: 63 BPM | DIASTOLIC BLOOD PRESSURE: 73 MMHG | BODY MASS INDEX: 25.8 KG/M2 | RESPIRATION RATE: 18 BRPM

## 2024-07-11 DIAGNOSIS — E11.42 TYPE 2 DIABETES MELLITUS WITH DIABETIC POLYNEUROPATHY, WITH LONG-TERM CURRENT USE OF INSULIN (HCC): Primary | ICD-10-CM

## 2024-07-11 DIAGNOSIS — Z79.4 TYPE 2 DIABETES MELLITUS WITH DIABETIC POLYNEUROPATHY, WITH LONG-TERM CURRENT USE OF INSULIN (HCC): Primary | ICD-10-CM

## 2024-07-11 DIAGNOSIS — E11.59 HYPERTENSION ASSOCIATED WITH DIABETES (HCC): ICD-10-CM

## 2024-07-11 DIAGNOSIS — F32.2 SEVERE MAJOR DEPRESSION (HCC): ICD-10-CM

## 2024-07-11 DIAGNOSIS — E78.5 HYPERLIPIDEMIA ASSOCIATED WITH TYPE 2 DIABETES MELLITUS (HCC): ICD-10-CM

## 2024-07-11 DIAGNOSIS — E11.69 HYPERLIPIDEMIA ASSOCIATED WITH TYPE 2 DIABETES MELLITUS (HCC): ICD-10-CM

## 2024-07-11 DIAGNOSIS — I15.2 HYPERTENSION ASSOCIATED WITH DIABETES (HCC): ICD-10-CM

## 2024-07-11 DIAGNOSIS — Z71.89 COUNSELING AND COORDINATION OF CARE: Primary | ICD-10-CM

## 2024-07-11 DIAGNOSIS — E11.649 HYPOGLYCEMIA ASSOCIATED WITH TYPE 2 DIABETES MELLITUS (HCC): ICD-10-CM

## 2024-07-11 LAB
ALBUMIN SERPL-MCNC: 3.9 G/DL (ref 3.5–5)
ALBUMIN/GLOB SERPL: 1.1 (ref 1.1–2.2)
ALP SERPL-CCNC: 79 U/L (ref 45–117)
ALT SERPL-CCNC: 25 U/L (ref 12–78)
ANION GAP SERPL CALC-SCNC: 5 MMOL/L (ref 5–15)
AST SERPL-CCNC: 21 U/L (ref 15–37)
BILIRUB SERPL-MCNC: 0.4 MG/DL (ref 0.2–1)
BUN SERPL-MCNC: 17 MG/DL (ref 6–20)
BUN/CREAT SERPL: 24 (ref 12–20)
CALCIUM SERPL-MCNC: 10.1 MG/DL (ref 8.5–10.1)
CHLORIDE SERPL-SCNC: 110 MMOL/L (ref 97–108)
CHOLEST SERPL-MCNC: 190 MG/DL
CO2 SERPL-SCNC: 29 MMOL/L (ref 21–32)
CREAT SERPL-MCNC: 0.71 MG/DL (ref 0.55–1.02)
ERYTHROCYTE [DISTWIDTH] IN BLOOD BY AUTOMATED COUNT: 13.2 % (ref 11.5–14.5)
EST. AVERAGE GLUCOSE BLD GHB EST-MCNC: 171 MG/DL
GLOBULIN SER CALC-MCNC: 3.5 G/DL (ref 2–4)
GLUCOSE SERPL-MCNC: 100 MG/DL (ref 65–100)
HBA1C MFR BLD: 7.6 % (ref 4–5.6)
HCT VFR BLD AUTO: 38.7 % (ref 35–47)
HCV AB SER IA-ACNC: 0.18 INDEX
HCV AB SERPL QL IA: NONREACTIVE
HDLC SERPL-MCNC: 96 MG/DL
HDLC SERPL: 2 (ref 0–5)
HGB BLD-MCNC: 12.8 G/DL (ref 11.5–16)
LDLC SERPL CALC-MCNC: 81.8 MG/DL (ref 0–100)
MCH RBC QN AUTO: 30.3 PG (ref 26–34)
MCHC RBC AUTO-ENTMCNC: 33.1 G/DL (ref 30–36.5)
MCV RBC AUTO: 91.5 FL (ref 80–99)
NRBC # BLD: 0 K/UL (ref 0–0.01)
NRBC BLD-RTO: 0 PER 100 WBC
PLATELET # BLD AUTO: 314 K/UL (ref 150–400)
PMV BLD AUTO: 9.3 FL (ref 8.9–12.9)
POTASSIUM SERPL-SCNC: 4.8 MMOL/L (ref 3.5–5.1)
PROT SERPL-MCNC: 7.4 G/DL (ref 6.4–8.2)
RBC # BLD AUTO: 4.23 M/UL (ref 3.8–5.2)
SODIUM SERPL-SCNC: 144 MMOL/L (ref 136–145)
TRIGL SERPL-MCNC: 61 MG/DL
VLDLC SERPL CALC-MCNC: 12.2 MG/DL
WBC # BLD AUTO: 6 K/UL (ref 3.6–11)

## 2024-07-11 PROCEDURE — 99214 OFFICE O/P EST MOD 30 MIN: CPT

## 2024-07-11 RX ORDER — LORAZEPAM 0.5 MG/1
TABLET ORAL
Status: CANCELLED | OUTPATIENT
Start: 2024-07-11

## 2024-07-11 RX ORDER — ATORVASTATIN CALCIUM 80 MG/1
80 TABLET, FILM COATED ORAL DAILY
Qty: 90 TABLET | Refills: 3 | Status: SHIPPED | OUTPATIENT
Start: 2024-07-11

## 2024-07-11 ASSESSMENT — PATIENT HEALTH QUESTIONNAIRE - PHQ9
SUM OF ALL RESPONSES TO PHQ QUESTIONS 1-9: 21
10. IF YOU CHECKED OFF ANY PROBLEMS, HOW DIFFICULT HAVE THESE PROBLEMS MADE IT FOR YOU TO DO YOUR WORK, TAKE CARE OF THINGS AT HOME, OR GET ALONG WITH OTHER PEOPLE: SOMEWHAT DIFFICULT
3. TROUBLE FALLING OR STAYING ASLEEP: NEARLY EVERY DAY
SUM OF ALL RESPONSES TO PHQ QUESTIONS 1-9: 21
6. FEELING BAD ABOUT YOURSELF - OR THAT YOU ARE A FAILURE OR HAVE LET YOURSELF OR YOUR FAMILY DOWN: NEARLY EVERY DAY
1. LITTLE INTEREST OR PLEASURE IN DOING THINGS: NEARLY EVERY DAY
SUM OF ALL RESPONSES TO PHQ9 QUESTIONS 1 & 2: 6
4. FEELING TIRED OR HAVING LITTLE ENERGY: MORE THAN HALF THE DAYS
9. THOUGHTS THAT YOU WOULD BE BETTER OFF DEAD, OR OF HURTING YOURSELF: NEARLY EVERY DAY
7. TROUBLE CONCENTRATING ON THINGS, SUCH AS READING THE NEWSPAPER OR WATCHING TELEVISION: MORE THAN HALF THE DAYS
2. FEELING DOWN, DEPRESSED OR HOPELESS: NEARLY EVERY DAY
8. MOVING OR SPEAKING SO SLOWLY THAT OTHER PEOPLE COULD HAVE NOTICED. OR THE OPPOSITE, BEING SO FIGETY OR RESTLESS THAT YOU HAVE BEEN MOVING AROUND A LOT MORE THAN USUAL: NOT AT ALL
SUM OF ALL RESPONSES TO PHQ QUESTIONS 1-9: 18
SUM OF ALL RESPONSES TO PHQ QUESTIONS 1-9: 21
5. POOR APPETITE OR OVEREATING: MORE THAN HALF THE DAYS

## 2024-07-11 ASSESSMENT — ANXIETY QUESTIONNAIRES
3. WORRYING TOO MUCH ABOUT DIFFERENT THINGS: NEARLY EVERY DAY
5. BEING SO RESTLESS THAT IT IS HARD TO SIT STILL: NOT AT ALL
GAD7 TOTAL SCORE: 16
2. NOT BEING ABLE TO STOP OR CONTROL WORRYING: NEARLY EVERY DAY
1. FEELING NERVOUS, ANXIOUS, OR ON EDGE: NEARLY EVERY DAY
7. FEELING AFRAID AS IF SOMETHING AWFUL MIGHT HAPPEN: NEARLY EVERY DAY
4. TROUBLE RELAXING: NEARLY EVERY DAY
IF YOU CHECKED OFF ANY PROBLEMS ON THIS QUESTIONNAIRE, HOW DIFFICULT HAVE THESE PROBLEMS MADE IT FOR YOU TO DO YOUR WORK, TAKE CARE OF THINGS AT HOME, OR GET ALONG WITH OTHER PEOPLE: NOT DIFFICULT AT ALL
6. BECOMING EASILY ANNOYED OR IRRITABLE: SEVERAL DAYS

## 2024-07-11 NOTE — PROGRESS NOTES
Psychosocial Assessment    Reason for Assessment:    [x] Social Work Navigator Referral [] Initial Assessment - OB [x] Initial Assessment - GEN [] +SDOH [] Other    Relationship Status:  []Single  [x]  []Significant Other/Life Partner  []  []  []    Living Circumstances:  []Lives Alone  [x]Family/Significant Other in Household  []Roommates  []Children in the Home  []Paid Caregivers  []Assisted Living Facility/Group Home  []Skilled Nursing Facility  []Homeless  []Environmental/Care Concerns  []Other:    Education:  []Elementary   []Middle School [x]High School   []Some College  []College Grad []Did not attend school      Employment Status:  [x]Employed Full-time []Employed Part-time []Homemaker  [] Retired [] Short-Term Disability [] Long-Term Disability  [] Unemployed   [] SSI   [] SSDI  [] Self-Employment    Barriers to Learning:    []Language  []Developmental  []Cognitive  []Altered Mental Status  []Visual/Hearing Impairment  []Unable to Read/Write   [x]No Barriers Identified      Financial/Legal Concerns:    []Uninsured  []Limited Income/Resources  []Non-Citizen  []Food Insecurity []No Concerns Identified   []Other:    Orthodox/Spiritual/Existential:  Does patient have any spiritual or Synagogue beliefs? [] Yes [x] No  Is the patient involved in a spiritual, tc or Synagogue community? [] Yes [x] No    Support System:    Identified Support Person/Group:  []Strong  []Fair  [x]Limited    Review of SDOH: (food, housing, transportation, financial)  General (do you have trouble making ends meet?)    Screening:   [x]  PHQ2  [x] PHQ9 [] EPDS [] ACE     Personal Safety:  Hx of Domestic violence - no Hx of domestic violence [] Yes [x] No  General safety - Do you feel safe in your relations, in your home, in your neighborhood [x] Yes [] No           Patient with hx of 4 strokes, reports not feeling better since having these health issues. Relationship problems in home. Usually by herself

## 2024-07-12 LAB
CREAT UR-MCNC: 106 MG/DL
MICROALBUMIN UR-MCNC: 9.77 MG/DL
MICROALBUMIN/CREAT UR-RTO: 92 MG/G (ref 0–30)

## 2024-07-12 RX ORDER — AMLODIPINE BESYLATE 10 MG/1
10 TABLET ORAL DAILY
Qty: 90 TABLET | Refills: 0 | Status: SHIPPED | OUTPATIENT
Start: 2024-07-12

## 2024-07-12 NOTE — PROGRESS NOTES
Chief Complaint   Patient presents with    Diabetes     Pt reports for DM, HTN      Subjective   Crystal Carrillo is an 74 y.o. female who presents for follow up on diabetes and hypertension    T2DM  On Toujeo 60 units daily, Ozempic 1mg weekly, and humalog 5 units BIDAC. Has not been taking jardiance 25 mg weekly.  Reviewed freestyle kinjal logs   Time in range: 65%  Time >250 m%  Time 181 - 250: 21%  Has a lot of   Has not scheduled diabetic retinal exam  Blood glucose log shows inconsistent blood glucose, has episodes of hyperglycemia in the 250s around midnight when she snacks a lot, early morning fasting blood glucose ranges from low to high. Has near hypoglycemic reading in the late afternoon and a few hyperglycemic episodes      HTN  On lisinopril 40mg daily, amlodipine 5 mg daily   Denies chest pain , palpitations, SOB, headache, pedal edema    Hyperlipidemia   Has run out of atorvastatin     MDD  Increased Phq9 score of 21 with a score of 3 on feeling she was better off dead. Denies any active or passive suicideal ideations but she feels that at 74 with her health issues she would not mind dying. Has an estranged relationship with her  and daughter who she lives with at home. Says she only feels alive when she is at work. Endorses poor sleep, fatigue, poor appetite. Denies any Si/HI      Review of Systems   Review of Systems     Allergies   Allergies   Allergen Reactions    Spironolactone Swelling     Mouth swelling    Elemental Sulfur Rash       Medications  Current Outpatient Medications   Medication Sig    amLODIPine (NORVASC) 10 MG tablet Take 1 tablet by mouth daily    atorvastatin (LIPITOR) 80 MG tablet Take 1 tablet by mouth daily    Glucose (TRUEPLUS) 15 GM/32ML GEL Take 32 mLs by mouth as needed (for low blood sugar)    DULoxetine (CYMBALTA) 60 MG extended release capsule 60 MG  , TAKE 1 CAPSULE DAILY    DULoxetine (CYMBALTA) 30 MG extended release capsule Take 1 capsule by mouth daily 
Patient has been identified by name and .    Chief Complaint   Patient presents with    Diabetes     Pt reports for DM, HTN       Vitals:    24 0802 24 0816   BP: (!) 160/75 (!) 146/73   Site: Left Upper Arm Right Upper Arm   Position: Sitting Sitting   Cuff Size: Medium Adult Medium Adult   Pulse: 65 63   Resp: 18    Temp: 98.1 °F (36.7 °C)    TempSrc: Oral    SpO2: 95%    Weight: 58.1 kg (128 lb)    Height: 1.499 m (4' 11\")         \"Have you been to the ER, urgent care clinic since your last visit?  Hospitalized since your last visit?\"    NO    “Have you seen or consulted any other health care providers outside of [facility] since your last visit?”    NO       Have you had a mammogram?”   NO    No breast cancer screening on file            
entry  Heart - normal rate, regular rhythm, normal S1, S2, no murmurs, rubs, clicks or gallops  Abdomen - soft, nontender, nondistended, no masses or organomegaly  Neurological - alert, oriented, normal speech, no focal findings or movement disorder noted  Musculoskeletal - no joint tenderness, deformity or swelling  Extremities - no pedal edema noted  Skin - normal coloration and turgor, no rashes, no suspicious skin lesions noted     Component      Latest Ref Rng 4/10/2024   Sodium      136 - 145 mmol/L 143    Potassium      3.5 - 5.1 mmol/L 4.3    Chloride      97 - 108 mmol/L 110 (H)    CARBON DIOXIDE      21 - 32 mmol/L 29    Anion Gap      5 - 15 mmol/L 4 (L)    Glucose, Random      65 - 100 mg/dL 295 (H)    BUN,BUNPL      6 - 20 MG/DL 16    Creatinine      0.55 - 1.02 MG/DL 0.79    Bun/Cre Ratio      12 - 20   20    Est, Glom Filt Rate      >60 ml/min/1.73m2 79    Calcium, Total      8.5 - 10.1 MG/DL 10.5 (H)    Total Bilirubin      0.2 - 1.0 MG/DL 0.3    ALT      12 - 78 U/L 28    AST      15 - 37 U/L 19    Alk Phos      45 - 117 U/L 103    Total Protein, Serum      6.4 - 8.2 g/dL 7.0    Albumin      3.5 - 5.0 g/dL 3.8    Globulin      2.0 - 4.0 g/dL 3.2    ALBUMIN/GLOBULIN RATIO      1.1 - 2.2   1.2    WBC      3.6 - 11.0 K/uL 5.4    RBC      3.80 - 5.20 M/uL 4.15    Hemoglobin Quant      11.5 - 16.0 g/dL 12.7    Hematocrit      35.0 - 47.0 % 37.9    MCV      80.0 - 99.0 FL 91.3    MCH      26.0 - 34.0 PG 30.6    MCHC      30.0 - 36.5 g/dL 33.5    RDW      11.5 - 14.5 % 13.2    Platelet Count      150 - 400 K/uL 286    MPV      8.9 - 12.9 FL 9.5    Nucleated Red Blood Cells      0.00 - 0.01 K/uL 0.00    Nucleated Red Blood Cells      0  WBC 0.0    Cholesterol, Total      <200 MG/    Triglycerides      <150 MG/DL 42    HDL Cholesterol      MG/DL 89    LDL Calculated      0 - 100 MG/DL 53.6    VLDL Cholesterol Calculated      MG/DL 8.4    Chol/HDL Ratio      0.0 - 5.0   1.7    Microalbumin,

## 2024-07-22 ENCOUNTER — OFFICE VISIT (OUTPATIENT)
Age: 74
End: 2024-07-22
Payer: MEDICARE

## 2024-07-22 DIAGNOSIS — E11.42 TYPE 2 DIABETES MELLITUS WITH DIABETIC POLYNEUROPATHY, WITH LONG-TERM CURRENT USE OF INSULIN (HCC): Primary | ICD-10-CM

## 2024-07-22 DIAGNOSIS — E11.649 HYPOGLYCEMIA ASSOCIATED WITH TYPE 2 DIABETES MELLITUS (HCC): ICD-10-CM

## 2024-07-22 DIAGNOSIS — Z79.4 TYPE 2 DIABETES MELLITUS WITH DIABETIC POLYNEUROPATHY, WITH LONG-TERM CURRENT USE OF INSULIN (HCC): Primary | ICD-10-CM

## 2024-07-22 PROCEDURE — G0108 DIAB MANAGE TRN  PER INDIV: HCPCS

## 2024-07-22 NOTE — PROGRESS NOTES
[facility]  Diabetes Self-Management Education & Support Program    Reason for Referral: Type 2 DM with polyneuropathy  Referral Source: Tammy Sifuentes MD  Services requested: DSMES       ASSESSMENT    From my perspective, the participant would benefit from DSMES specifically related to reducing risks, healthy eating, monitoring, taking medications, physical activity, healthy coping, and problem solving. Will adapt DSMES program to build on participant's skills score, confidence score, and preparedness score as noted in the Diabetes Skills, Confidence, and Preparedness Index.    During the program, we will focus on providing DSMES that specifically addresses participant's interest in reducing risks, healthy eating, monitoring, taking medications, physical activity, healthy coping, and problem solving, as shown by their reported readiness to change.    The participant would be best served by attending weekly group class series.    Diabetes Self-Management Education Follow-up Visit:   Future Appointments         Provider Specialty Dept Phone    8/16/2024 8:30 AM DIABETES GROUP CLASS [facility] [phone]    8/23/2024 8:30 AM DIABETES GROUP CLASS [facility] [phone]    8/30/2024 8:30 AM DIABETES GROUP CLASS [facility] [phone]    9/6/2024 8:30 AM DIABETES GROUP CLASS [facility] [phone]             During today's session reviewed insulin dosing for toujeo and humalog.  Participant was unclear of current prescription.         Clinical Presentation  Crystal Carrillo is a 74 y.o.  female referred for diabetes self-management education. Participant has Type 2 DM on insulin for 11-20 years. Family history unknown for diabetes. Patient reports receiving DSMES services in the past. Most recent A1c value:   Lab Results   Component Value Date/Time    LABA1C 7.6 07/11/2024 09:20 AM       Diabetes-related medical

## 2024-07-24 ENCOUNTER — TELEPHONE (OUTPATIENT)
Age: 74
End: 2024-07-24

## 2024-07-24 NOTE — TELEPHONE ENCOUNTER
Patient called requesting a phone call from the doctor. She stated that it was in regards to her last appointment and having a breakdown and her losing her job. She would like to be contacted at your earliest convenience.    Thanks!

## 2024-07-25 RX ORDER — LORAZEPAM 0.5 MG/1
TABLET ORAL
OUTPATIENT
Start: 2024-07-25

## 2024-07-25 NOTE — TELEPHONE ENCOUNTER
Medication Refill Request    Crystal Carrillo is requesting a refill of the following medication(s):   Requested Prescriptions     Pending Prescriptions Disp Refills    LORazepam (ATIVAN) 0.5 MG tablet       Sig: Take by mouth.        Listed PCP is Tammy Sifuentes MD   Last provider to prescribe medication: Historical, no data found on [facility].  Last Date of Medication Prescribed: 01/10/2023   Date of Last Office Visit at [facility]: 07/11/2024 for DM     FUTURE APPOINTMENT:   Future Appointments   Date Time Provider Department Center   8/16/2024  8:30 AM DIABETES GROUP CLASS [facility]   8/23/2024  8:30 AM DIABETES GROUP CLASS [facility]   8/30/2024  8:30 AM DIABETES GROUP CLASS [facility] AM[facility]   9/6/2024  8:30 AM DIABETES GROUP CLASS [facility]       Please send refill to:    [facility]/pharmacy #[id number] - [place], [place] - [address] - P [phone] - F [phone] [address] [address]  Phone: [phone] Fax: [phone]      Please review request and approve or deny with recommendations.

## 2024-07-25 NOTE — TELEPHONE ENCOUNTER
Pt stated that she forgot to mention needing a refill of LORazepam (ATIVAN) 0.5 MG tablet (once or twice a day, as needed, per pt). If appropriate, please send to [facility] at [address], [place] [address]. Please ensure Rx does not go to [facility], due to not being maintenance medication. She also stated that the Rx usually requires a PA and asked if you can begin the process, when Rx is written.    Thank you

## 2024-08-16 ENCOUNTER — NURSE ONLY (OUTPATIENT)
Age: 74
End: 2024-08-16
Payer: MEDICARE

## 2024-08-16 DIAGNOSIS — Z79.4 TYPE 2 DIABETES MELLITUS WITH DIABETIC POLYNEUROPATHY, WITH LONG-TERM CURRENT USE OF INSULIN (HCC): Primary | ICD-10-CM

## 2024-08-16 DIAGNOSIS — E11.42 TYPE 2 DIABETES MELLITUS WITH DIABETIC POLYNEUROPATHY, WITH LONG-TERM CURRENT USE OF INSULIN (HCC): Primary | ICD-10-CM

## 2024-08-16 PROCEDURE — G0109 DIAB MANAGE TRN IND/GROUP: HCPCS

## 2024-08-16 NOTE — PROGRESS NOTES
[facility]  Diabetes Self-Management Education & Support Program  Encounter Note      SUMMARY  Diabetes self-care management training was completed related to what is diabetes and  reducing risks. The participant will return on August 23 to continue DSMES related to healthy eating and monitoring. The participant did not identify SMART Goal(s) and will practice knowledge and skills related to reducing risks and monitoring to improve diabetes self-management.      EVALUATION:  Participant states that she is monitoring her blood sugars using the Freestyle Kannan CGM device. She says her blood sugars have been higher in the mornings. She is working on eating healthy meals and monitoring her carbohydrate amounts to reduce these blood sugars. She has a history of having strokes in the past. She is up to date with her eye, dental and foot examinations. She will soon be going back to work part-time and will be more physically active weekly.     RECOMMENDATIONS:  Encouraged her to share her blood sugars with her providers. Treat any below 70 mg/dL blood sugars with 4oz to 6oz of juice or regular soda. Notify her providers when having low blood sugars. Take her medications as prescribed. Talk to her provider whenever needed about her diabetes management.    TOPICS DISCUSSED TODAY:  WHAT IS DIABETES? Minutes: 60  HOW DO I STAY HEALTHY? 60    Next provider visit is scheduled for She will schedule her next follow up visit with her provider.     DATE DSMES TOPIC EVALUATION     8/16/2024 WHAT IS DIABETES?   Role of the normal pancreas in energy balance and blood glucose control   The defect seen in diabetes   Signs & symptoms of diabetes   Diagnosis of diabetes   Types of diabetes   Blood glucose targets in non-pregnant & non-pregnant adults       The participant knows  Their type of diabetes: Yes   The basic physiologic defect: Yes  Blood glucose targets: Yes     DATE DSMES TOPIC EVALUATION

## 2024-08-30 ENCOUNTER — NURSE ONLY (OUTPATIENT)
Age: 74
End: 2024-08-30

## 2024-08-30 DIAGNOSIS — Z79.4 TYPE 2 DIABETES MELLITUS WITH DIABETIC POLYNEUROPATHY, WITH LONG-TERM CURRENT USE OF INSULIN (HCC): Primary | ICD-10-CM

## 2024-08-30 DIAGNOSIS — E11.649 HYPOGLYCEMIA ASSOCIATED WITH TYPE 2 DIABETES MELLITUS (HCC): ICD-10-CM

## 2024-08-30 DIAGNOSIS — E11.42 TYPE 2 DIABETES MELLITUS WITH DIABETIC POLYNEUROPATHY, WITH LONG-TERM CURRENT USE OF INSULIN (HCC): Primary | ICD-10-CM

## 2024-08-30 NOTE — PROGRESS NOTES
[facility]  Diabetes Self-Management Education & Support Program  Encounter Note      SUMMARY  Diabetes self-care management training was completed related to taking medications and physical activity. The participant will return on September 04 to continue DSMES related to healthy eating and monitoring. The participant did not identify SMART Goal(s) and will practice knowledge and skills related to being active and medications to improve diabetes self-management.      Key Antihyperglycemic Medications               Glucose (TRUEPLUS) 15 GM/32ML GEL Take 32 mLs by mouth as needed (for low blood sugar)    Insulin Glargine, 2 Unit Dial, (TOUJEO MAX SOLOSTAR) 300 UNIT/ML SOPN Inject 40 Units into the skin daily    glucagon 1 MG injection Inject 1 mg into the muscle See Admin Instructions Follow package directions for low blood sugar.    insulin lispro, 1 Unit Dial, (HUMALOG KWIKPEN) 100 UNIT/ML SOPN Inject into the skin    empagliflozin (JARDIANCE) 25 MG tablet Take 1 tablet by mouth    Semaglutide (OZEMPIC, 1 MG/DOSE, SC) Inject into the skin            EVALUATION:  Expressed understanding of diabetes medications, signs, symptoms, and treatment of hypoglycemia. States understanding of the positive impact physical activity has on BG. Participated in physical activity during today's session.  Participant mentioned several times that she stays in bed a lot.  Group explored ways to motivate participant for adding physical activity.  Suggested that her first step could be putting a sturdy chair next to bed for chair exercises.  CGM data reviewed for past 7 days: TIR 83%; 181-250, 14%; and >250 at 3%.      RECOMMENDATIONS:  Complete SMART goal by next education session  Attend make up session healthy eating and monitoring     TOPICS DISCUSSED TODAY:  HOW DO MY DIABETES MEDICATIONS WORK? 60  HOW DOES PHYSICAL ACTIVITY AFFECT MY DIABETES? 30      Next provider visit is scheduled for   Future

## 2024-09-04 ENCOUNTER — OFFICE VISIT (OUTPATIENT)
Age: 74
End: 2024-09-04
Payer: MEDICARE

## 2024-09-04 DIAGNOSIS — E11.649 HYPOGLYCEMIA ASSOCIATED WITH TYPE 2 DIABETES MELLITUS (HCC): ICD-10-CM

## 2024-09-04 DIAGNOSIS — E11.42 TYPE 2 DIABETES MELLITUS WITH DIABETIC POLYNEUROPATHY, WITH LONG-TERM CURRENT USE OF INSULIN (HCC): Primary | ICD-10-CM

## 2024-09-04 DIAGNOSIS — Z79.4 TYPE 2 DIABETES MELLITUS WITH DIABETIC POLYNEUROPATHY, WITH LONG-TERM CURRENT USE OF INSULIN (HCC): Primary | ICD-10-CM

## 2024-09-04 PROCEDURE — G0108 DIAB MANAGE TRN  PER INDIV: HCPCS

## 2024-09-04 NOTE — PROGRESS NOTES
[facility]  Diabetes Self-Management Education & Support Program  Encounter Note      SUMMARY  Diabetes self-care management training was completed related to healthy eating and monitoring. The participant will return on September 06 to continue DSMES related to healthy coping and problem solving. The participant did identify SMART Goal(s) and will practice knowledge and skills related to healthy eating and monitoring to improve diabetes self-management.        EVALUATION:  Participant expressed understanding of using healthy plate to build balanced meals, manage portions, and reading nutrition facts labels to make more informed food choices.  Will try to include 45-60g of nutrient dense CHOs per meal. States understanding of the ways BG monitoring can assist in managing DM, including the impact of food type & amount on BG and how physical activity affects BG.  Participant is still working on SMART goal.  CGM data for past 7 days reviewed: TIR 74%; high 23%, very high 3%; average  and GMI 6.9%.      RECOMMENDATIONS:  Include at least 45g CHO at each meal  Snack on non-starchy foods     TOPICS DISCUSSED TODAY:  WHAT CAN I EAT? 30  HOW CAN BLOOD GLUCOSE MONITORING HELP ME? 30      Next provider visit is scheduled for   Future Appointments         Provider Specialty Dept Phone    9/6/2024 8:30 AM DIABETES GROUP CLASS [facility] [phone]                SMART GOAL(S)  I will move a more sturdy chair into bedroom and put beside bed for chair exercises by next education session.   ACHIEVEMENT OF GOAL(S) : 0-24%       DATE DSMES TOPIC EVALUATION     9/4/2024 WHAT CAN I EAT?   General principles   Determining a healthy weight   Nutritional terms & tools   Healthy Plate method   Carbohydrate Counting   Reading food labels   Free apps   Pregnancy recommendations      The participant   Uses Healthy Plate principles in constructing meals: Yes  Reads food labels in choosing

## 2024-09-06 ENCOUNTER — NURSE ONLY (OUTPATIENT)
Age: 74
End: 2024-09-06

## 2024-09-06 DIAGNOSIS — E11.42 TYPE 2 DIABETES MELLITUS WITH DIABETIC POLYNEUROPATHY, WITH LONG-TERM CURRENT USE OF INSULIN (HCC): Primary | ICD-10-CM

## 2024-09-06 DIAGNOSIS — Z79.4 TYPE 2 DIABETES MELLITUS WITH DIABETIC POLYNEUROPATHY, WITH LONG-TERM CURRENT USE OF INSULIN (HCC): Primary | ICD-10-CM

## 2024-09-06 NOTE — PROGRESS NOTES
[facility]  Diabetes Self-Management Education & Support Program  Encounter Note      SUMMARY  Diabetes self-care management training was completed related to healthy coping and problem solving. The participant will return on October 21 for a follow-up visit. The participant did not identify SMART Goal(s) and will practice knowledge and skills related to reducing risks, healthy eating and monitoring, and being active and medications to improve diabetes self-management.      EVALUATION:  Participant believes that she is doing better with her diabetes management.  She is taking her medications as prescribed however she does miss some doses of medications weekly.  She is eating healthier meals and monitoring her carbohydrate amounts.  She is monitoring her blood sugars using her freestyle kinjal CGM.  Her fasting blood sugar this morning was 176 mg/dL.  She will continue to increase physical activity during her week by doing some chair exercises.  When stressed she enjoys relaxing and watching TV.    RECOMMENDATIONS:  Encouraged her to continue to eat healthy meals throughout the day.  Continue to take her medications as prescribed throughout the week.  Engage in some regular physical activities weekly.  Talk to her providers whenever needed about her diabetes management.    TOPICS DISCUSSED TODAY:  HOW DO I FIND SUPPORT TO TACKLE THIS CONDITION? 60  HOW DO I FIGURE OUT WHAT'S INFLUENCING MY BLOOD GLUCOSES? 30    Next provider visit is scheduled for she will schedule her next diabetes follow-up appointment with her provider.     SMART GOAL(S)  I will move a more sturdy chair into bedroom and put beside bed for chair exercises by next education session.   ACHIEVEMENT OF GOAL(S) : 0-24%     DATE DSMES TOPIC EVALUATION     9/6/2024 HOW DO I FIND SUPPORT TO TACKLE THIS CONDITION?   Normal responses to diabetes diagnosis or complication   Shock   Anger & resentment   Guilt/self-blame   Sadness &

## 2024-09-09 ENCOUNTER — TELEPHONE (OUTPATIENT)
Age: 74
End: 2024-09-09

## 2024-10-21 ENCOUNTER — OFFICE VISIT (OUTPATIENT)
Age: 74
End: 2024-10-21
Payer: MEDICARE

## 2024-10-21 DIAGNOSIS — Z79.4 TYPE 2 DIABETES MELLITUS WITH DIABETIC POLYNEUROPATHY, WITH LONG-TERM CURRENT USE OF INSULIN (HCC): Primary | ICD-10-CM

## 2024-10-21 DIAGNOSIS — E11.42 TYPE 2 DIABETES MELLITUS WITH DIABETIC POLYNEUROPATHY, WITH LONG-TERM CURRENT USE OF INSULIN (HCC): Primary | ICD-10-CM

## 2024-10-21 PROCEDURE — G0108 DIAB MANAGE TRN  PER INDIV: HCPCS

## 2024-10-21 NOTE — PROGRESS NOTES
of hyperglycemia: Fatigue    The participant knows how to prevent hyperglycemia: take medications as instructed    Sick Day Management:  The participant knows what to do differently on sick days: Stay hydrated, Check blood glucose every 2-4 hours    Pattern Management:  The participant can notice blood glucose patterns when looking at their blood glucose readings: Yes    How do you use the blood glucose readings from your meter or logbook: understand how body responds to meals      Note: Content derived from the American Association of Diabetes Educators' Diabetes Education Curriculum: A Guide to Successful Self-Management (3rd edition)      I have personally reviewed the health record, including provider notes, laboratory data and current medications before making these care and education recommendations. The time spent in this effort is included in the total time.  Total minutes: 30    Diabetes Skills, Confidence & Preparedness Index (SCPI) ©    Thank you for completing the Skills, Confidence & Preparedness Index!  Below are your scores.  All scales and questions are out of 7.  If you would like these results emailed, please enter your email address along with some identifying patient information.  Email:  Patient Identifier:      Overall SCPI score: 5.9 Skills Score: 5.7  Low: Healthy Eating(Q1),Blood Sugar Monitoring(Q4),Blood Sugar Monitoring(Q8) Confidence Score: 6.0  Low: Healthy Eating(Q1),Healthy Coping(Q2),Reducing Risks(Q3),Healthy Eating(Q4),Being Active(Q5),Blood Sugar Monitoring(Q6),Problem Solving(Q7) Preparedness Score: 6.1  Low: Healthy Eating(Q1),Healthy Coping(Q3),Reducing Risks(Q4),Taking Medication(Q5),Blood Sugar Monitoring(Q6),Problem Solving(Q7)   Healthy Eating Score: 5.8  Low: Skills(Q1) Taking Medication Score: 6.0  Low: Skills(Q2),Preparedness(Q5) Blood Sugar Monitoring Score: 5.6  Low: Skills(Q4),Skills(Q8) Reducing Risks Score: 6.0  Low:

## 2024-10-24 ENCOUNTER — TELEPHONE (OUTPATIENT)
Age: 74
End: 2024-10-24

## 2024-10-24 NOTE — TELEPHONE ENCOUNTER
Pt called appearing to be confused. She stated that [facility] said that they cannot send a refill request to you for Amlodipine 5 mg, due to you not being in their system. I reviewed pt's medication list and see that you cancelled the 5 mg Rx. When pt was asked if she is supposed to take 10 or 15 mg. She stated that was unaware if you have lowered her dose. Please contact pt to clarify the dose she is supposed to be taking.     If she is still supposed to be taking 15 mg, please send new Rx for the 5 mg to [facility] - [address] [phone] - F [phone]     Thank you

## 2024-11-21 ENCOUNTER — OFFICE VISIT (OUTPATIENT)
Age: 74
End: 2024-11-21
Payer: MEDICARE

## 2024-11-21 VITALS
OXYGEN SATURATION: 99 % | TEMPERATURE: 97.3 F | BODY MASS INDEX: 26.41 KG/M2 | HEART RATE: 66 BPM | RESPIRATION RATE: 18 BRPM | SYSTOLIC BLOOD PRESSURE: 134 MMHG | WEIGHT: 131 LBS | DIASTOLIC BLOOD PRESSURE: 71 MMHG | HEIGHT: 59 IN

## 2024-11-21 DIAGNOSIS — I15.2 HYPERTENSION ASSOCIATED WITH DIABETES (HCC): Primary | ICD-10-CM

## 2024-11-21 DIAGNOSIS — F32.2 SEVERE MAJOR DEPRESSION (HCC): ICD-10-CM

## 2024-11-21 DIAGNOSIS — E11.59 HYPERTENSION ASSOCIATED WITH DIABETES (HCC): Primary | ICD-10-CM

## 2024-11-21 DIAGNOSIS — E11.42 TYPE 2 DIABETES MELLITUS WITH DIABETIC POLYNEUROPATHY, WITH LONG-TERM CURRENT USE OF INSULIN (HCC): ICD-10-CM

## 2024-11-21 DIAGNOSIS — Z79.4 TYPE 2 DIABETES MELLITUS WITH DIABETIC POLYNEUROPATHY, WITH LONG-TERM CURRENT USE OF INSULIN (HCC): ICD-10-CM

## 2024-11-21 PROCEDURE — 99214 OFFICE O/P EST MOD 30 MIN: CPT

## 2024-11-21 RX ORDER — AMLODIPINE BESYLATE 10 MG/1
10 TABLET ORAL DAILY
Qty: 90 TABLET | Refills: 3 | Status: SHIPPED | OUTPATIENT
Start: 2024-11-21

## 2024-11-21 SDOH — ECONOMIC STABILITY: FOOD INSECURITY: WITHIN THE PAST 12 MONTHS, YOU WORRIED THAT YOUR FOOD WOULD RUN OUT BEFORE YOU GOT MONEY TO BUY MORE.: NEVER TRUE

## 2024-11-21 SDOH — ECONOMIC STABILITY: INCOME INSECURITY: HOW HARD IS IT FOR YOU TO PAY FOR THE VERY BASICS LIKE FOOD, HOUSING, MEDICAL CARE, AND HEATING?: NOT VERY HARD

## 2024-11-21 SDOH — ECONOMIC STABILITY: FOOD INSECURITY: WITHIN THE PAST 12 MONTHS, THE FOOD YOU BOUGHT JUST DIDN'T LAST AND YOU DIDN'T HAVE MONEY TO GET MORE.: NEVER TRUE

## 2024-11-21 ASSESSMENT — PATIENT HEALTH QUESTIONNAIRE - PHQ9
SUM OF ALL RESPONSES TO PHQ QUESTIONS 1-9: 0
SUM OF ALL RESPONSES TO PHQ QUESTIONS 1-9: 0
1. LITTLE INTEREST OR PLEASURE IN DOING THINGS: NOT AT ALL
SUM OF ALL RESPONSES TO PHQ9 QUESTIONS 1 & 2: 0
SUM OF ALL RESPONSES TO PHQ QUESTIONS 1-9: 0
2. FEELING DOWN, DEPRESSED OR HOPELESS: NOT AT ALL
SUM OF ALL RESPONSES TO PHQ QUESTIONS 1-9: 0

## 2024-11-21 NOTE — PROGRESS NOTES
Chief Complaint   Patient presents with    Hypertension      Subjective   Crystal Carrillo is an 74 y.o. female who presents for       Hypertension   On Amlodipine 10 mg, lisinopril 40 mg daily    Diabetes  Toujeo 40-60 units daily   Ozempic 1 mg weekly   Jardiance 25 mg   Humalog  Follows with Dr Hu , was recently seen by diabetes educator to discuss insulin pump  Breakfast: sausage egg and cheese mcmuffin, coffee with sweet and low 34g carbs  Lunch: wendys single, small fries 36g + 29 g  Dinner:   Snacks: does not snack  Blood sugar time within  range for the last 7 days 58%, 71% over the last 14 days  Highs are usually when fasting with well controlled post prandial sugar    Depression  Feels better controlled   Denies SI    Review of Systems   Review of Systems     Allergies   Allergies   Allergen Reactions    Spironolactone Swelling     Mouth swelling    Elemental Sulfur Rash       Medications  Current Outpatient Medications   Medication Sig    amLODIPine (NORVASC) 10 MG tablet Take 1 tablet by mouth daily    atorvastatin (LIPITOR) 80 MG tablet Take 1 tablet by mouth daily    DULoxetine (CYMBALTA) 60 MG extended release capsule 60 MG  , TAKE 1 CAPSULE DAILY    DULoxetine (CYMBALTA) 30 MG extended release capsule Take 1 capsule by mouth daily    gabapentin (NEURONTIN) 100 MG capsule Take 1 capsule by mouth at bedtime.    Insulin Glargine, 2 Unit Dial, (TOUJEO MAX SOLOSTAR) 300 UNIT/ML SOPN Inject 40 Units into the skin daily    Nutritional Supplements (GLUCOSE MANAGEMENT) TABS Take 1 tablet by mouth as needed (low blood sugar)    buPROPion (WELLBUTRIN XL) 300 MG extended release tablet 300 MG  , TAKE 1 TABLET DAILY IN THE MORNING    clopidogrel (PLAVIX) 75 MG tablet Take by mouth    insulin lispro, 1 Unit Dial, (HUMALOG KWIKPEN) 100 UNIT/ML SOPN Inject into the skin    empagliflozin (JARDIANCE) 25 MG tablet Take 1 tablet by mouth    lisinopril (PRINIVIL;ZESTRIL) 40 MG tablet 40 MG  , TAKE 1 TABLET DAILY

## 2024-11-21 NOTE — PROGRESS NOTES
Roomed by name and .    Chief Complaint   Patient presents with    Hypertension        Vitals:    24 1001   BP: 134/71   Pulse: 66   Resp: 18   Temp: 97.3 °F (36.3 °C)   TempSrc: Temporal   SpO2: 99%   Weight: 59.4 kg (131 lb)   Height: 1.499 m (4' 11\")          \"Have you been to the ER, urgent care clinic since your last visit?  Hospitalized since your last visit?\"    NO    “Have you seen or consulted any other health care providers outside of [facility] since your last visit?”    NO    Compared our b/p machine with pt's b/p machine.  Ours: 134/71  Hers:  167/71  Dr. Sifuentes notified.                 Click Here for Release of Records Request

## 2024-12-18 ENCOUNTER — TELEPHONE (OUTPATIENT)
Age: 74
End: 2024-12-18

## 2024-12-18 NOTE — TELEPHONE ENCOUNTER
Patient called wanting to speak with the doctor in regards to the Lorazepam medication. She stalked about taking the medication off and on for years and now it is considered a controlled substance. Patient didn't give any specifics about why she was wanting to speak with the doctor about the medication. Patient is requesting a call back at your earliest convenience.    Thanks!

## 2025-01-20 NOTE — TELEPHONE ENCOUNTER
Received a fax from [facility] for Lisinopril 40 MG and Toujeo Pen. Called Pt to verify pharmacy. Pt stated that her pharmay is no more [facility] and asked to send  the refill request for Lisinopril to [facility] instead. Pt stated she doesnot need a refill for the Toujeo Pen at this time. She will call if needed. Pharmacy up to date.      Medication Refill Request    Crystal J Gerardo is requesting a refill of the following medication(s):   Requested Prescriptions     Pending Prescriptions Disp Refills    lisinopril (PRINIVIL;ZESTRIL) 40 MG tablet 30 tablet         Listed PCP is Tammy Sifuentes MD   Last provider to prescribe medication: Dr. Sifuentes  Last Date of Medication Prescribed: No data on connect care  Date of Last Office Visit at [facility]: 11/21/2024     FUTURE APPOINTMENT: No future appointments.    Please send refill to:    [facility] [place] [facility] - [facility] - P [phone] - F [phone]  [facility]  [place] [address]  Phone: [phone] Fax: [phone]      Please review request and approve or deny with recommendations.

## 2025-01-21 RX ORDER — LISINOPRIL 40 MG/1
40 TABLET ORAL DAILY
Qty: 90 TABLET | Refills: 3 | Status: SHIPPED | OUTPATIENT
Start: 2025-01-21

## 2025-02-17 ENCOUNTER — OFFICE VISIT (OUTPATIENT)
Age: 75
End: 2025-02-17
Payer: MEDICARE

## 2025-02-17 VITALS
WEIGHT: 127 LBS | DIASTOLIC BLOOD PRESSURE: 77 MMHG | HEART RATE: 71 BPM | HEIGHT: 59 IN | SYSTOLIC BLOOD PRESSURE: 150 MMHG | TEMPERATURE: 98 F | OXYGEN SATURATION: 97 % | BODY MASS INDEX: 25.6 KG/M2 | RESPIRATION RATE: 18 BRPM

## 2025-02-17 DIAGNOSIS — R20.2 PARESTHESIA: Primary | ICD-10-CM

## 2025-02-17 DIAGNOSIS — I10 PRIMARY HYPERTENSION: ICD-10-CM

## 2025-02-17 PROCEDURE — 99213 OFFICE O/P EST LOW 20 MIN: CPT

## 2025-02-17 RX ORDER — BLOOD PRESSURE TEST KIT
1 KIT MISCELLANEOUS DAILY
Qty: 1 KIT | Refills: 0 | Status: SHIPPED | OUTPATIENT
Start: 2025-02-17

## 2025-02-17 SDOH — ECONOMIC STABILITY: FOOD INSECURITY: WITHIN THE PAST 12 MONTHS, THE FOOD YOU BOUGHT JUST DIDN'T LAST AND YOU DIDN'T HAVE MONEY TO GET MORE.: NEVER TRUE

## 2025-02-17 SDOH — ECONOMIC STABILITY: FOOD INSECURITY: WITHIN THE PAST 12 MONTHS, YOU WORRIED THAT YOUR FOOD WOULD RUN OUT BEFORE YOU GOT MONEY TO BUY MORE.: NEVER TRUE

## 2025-02-17 ASSESSMENT — ANXIETY QUESTIONNAIRES
4. TROUBLE RELAXING: NOT AT ALL
5. BEING SO RESTLESS THAT IT IS HARD TO SIT STILL: NOT AT ALL
2. NOT BEING ABLE TO STOP OR CONTROL WORRYING: NOT AT ALL
3. WORRYING TOO MUCH ABOUT DIFFERENT THINGS: NOT AT ALL
1. FEELING NERVOUS, ANXIOUS, OR ON EDGE: NOT AT ALL
GAD7 TOTAL SCORE: 0
7. FEELING AFRAID AS IF SOMETHING AWFUL MIGHT HAPPEN: NOT AT ALL
6. BECOMING EASILY ANNOYED OR IRRITABLE: NOT AT ALL
IF YOU CHECKED OFF ANY PROBLEMS ON THIS QUESTIONNAIRE, HOW DIFFICULT HAVE THESE PROBLEMS MADE IT FOR YOU TO DO YOUR WORK, TAKE CARE OF THINGS AT HOME, OR GET ALONG WITH OTHER PEOPLE: NOT DIFFICULT AT ALL

## 2025-02-17 ASSESSMENT — PATIENT HEALTH QUESTIONNAIRE - PHQ9
10. IF YOU CHECKED OFF ANY PROBLEMS, HOW DIFFICULT HAVE THESE PROBLEMS MADE IT FOR YOU TO DO YOUR WORK, TAKE CARE OF THINGS AT HOME, OR GET ALONG WITH OTHER PEOPLE: NOT DIFFICULT AT ALL
SUM OF ALL RESPONSES TO PHQ QUESTIONS 1-9: 0
SUM OF ALL RESPONSES TO PHQ QUESTIONS 1-9: 0
SUM OF ALL RESPONSES TO PHQ9 QUESTIONS 1 & 2: 0
2. FEELING DOWN, DEPRESSED OR HOPELESS: NOT AT ALL
5. POOR APPETITE OR OVEREATING: NOT AT ALL
9. THOUGHTS THAT YOU WOULD BE BETTER OFF DEAD, OR OF HURTING YOURSELF: NOT AT ALL
SUM OF ALL RESPONSES TO PHQ QUESTIONS 1-9: 0
4. FEELING TIRED OR HAVING LITTLE ENERGY: NOT AT ALL
SUM OF ALL RESPONSES TO PHQ QUESTIONS 1-9: 0
1. LITTLE INTEREST OR PLEASURE IN DOING THINGS: NOT AT ALL
8. MOVING OR SPEAKING SO SLOWLY THAT OTHER PEOPLE COULD HAVE NOTICED. OR THE OPPOSITE, BEING SO FIGETY OR RESTLESS THAT YOU HAVE BEEN MOVING AROUND A LOT MORE THAN USUAL: NOT AT ALL
6. FEELING BAD ABOUT YOURSELF - OR THAT YOU ARE A FAILURE OR HAVE LET YOURSELF OR YOUR FAMILY DOWN: NOT AT ALL
3. TROUBLE FALLING OR STAYING ASLEEP: NOT AT ALL
7. TROUBLE CONCENTRATING ON THINGS, SUCH AS READING THE NEWSPAPER OR WATCHING TELEVISION: NOT AT ALL

## 2025-02-17 ASSESSMENT — ENCOUNTER SYMPTOMS
CHEST TIGHTNESS: 0
WHEEZING: 0
CHOKING: 0
COUGH: 0
SHORTNESS OF BREATH: 0

## 2025-02-17 NOTE — PROGRESS NOTES
[facility] Residency     Subjective   Crystal Carrillo is a 74 y.o. female who presents for Oral Swelling    Feels like there is oral swelling/lip tingling. After extensive Google search pt has concluded the cause to be Lisinopril.   Has been taking Lisinopril for almost 10 years now. No issues with respirations. No angioedema. Follows with dentist.       Please note that this dictation was completed with [employer], the computer voice recognition software.  Quite often unanticipated grammatical, syntax, homophones, and other interpretive errors are inadvertently transcribed by the computer software.  Please disregard these errors.  Please excuse any errors that have escaped final proofreading.     Review of Systems   Review of Systems   Respiratory:  Negative for cough, choking, chest tightness, shortness of breath and wheezing.    Cardiovascular:  Negative for palpitations and leg swelling.      Positive for lip tingling.     Medical History  Past Medical History:   Diagnosis Date    Cerebral artery occlusion with cerebral infarction (HCC)     Diabetes mellitus (HCC)     Hypertension        Medications  Current Outpatient Medications   Medication Sig    Blood Pressure KIT 1 Box by Does not apply route daily    lisinopril (PRINIVIL;ZESTRIL) 40 MG tablet Take 1 tablet by mouth daily    amLODIPine (NORVASC) 10 MG tablet Take 1 tablet by mouth daily    atorvastatin (LIPITOR) 80 MG tablet Take 1 tablet by mouth daily    Glucose (TRUEPLUS) 15 GM/32ML GEL Take 32 mLs by mouth as needed (for low blood sugar)    DULoxetine (CYMBALTA) 60 MG extended release capsule 60 MG  , TAKE 1 CAPSULE DAILY    DULoxetine (CYMBALTA) 30 MG extended release capsule Take 1 capsule by mouth daily    gabapentin (NEURONTIN) 100 MG capsule Take 1 capsule by mouth at bedtime.    Insulin Glargine, 2 Unit Dial, (TOUJEO MAX SOLOSTAR) 300 UNIT/ML SOPN Inject 40 Units into the skin daily    glucagon

## 2025-02-17 NOTE — PROGRESS NOTES
Room 20    Patient is accompanied by self. I have received verbal consent from Crystal Carrillo to discuss any/all medical information while they are present in the room.    Identified pt with two pt identifiers(name and ). Reviewed record in preparation for visit and have obtained necessary documentation.  Chief Complaint   Patient presents with    Oral Swelling         Lips swelling ( sensations) x 6 months or less         She has not taken any BP meds today     Health Maintenance Due   Topic    Diabetic retinal exam     DTaP/Tdap/Td vaccine (1 - Tdap)    Shingles vaccine (1 of 2)    DEXA (modify frequency per FRAX score)     Respiratory Syncytial Virus (RSV) Pregnant or age 60 yrs+ (1 - Risk 60-74 years 1-dose series)    COVID-19 Vaccine (2 -  season)    Breast cancer screen     Diabetic foot exam     Annual Wellness Visit (Medicare Advantage)        Vitals:    25 1357   BP: (!) 150/77   Site: Left Upper Arm   Position: Sitting   Cuff Size: Small Adult   Pulse: 71   Resp: 18   Temp: 98 °F (36.7 °C)   TempSrc: Temporal   SpO2: 97%   Weight: 57.6 kg (127 lb)   Height: 1.499 m (4' 11\")       Social Determinants Of Health:       SDOH screening not required at visit.  Resources Declined.   See AVS for attached resources, if requested.    Coordination of Care Questionnaire:       \"Have you been to the ER, urgent care clinic since your last visit?  Hospitalized since your last visit?\"    NO    “Have you seen or consulted any other health care providers outside of [facility] since your last visit?”    NO    Have you had a mammogram?”   NO    No breast cancer screening on file             Click Here for Release of Records Request

## 2025-02-21 ENCOUNTER — OFFICE VISIT (OUTPATIENT)
Age: 75
End: 2025-02-21
Payer: MEDICARE

## 2025-02-21 VITALS
HEART RATE: 76 BPM | DIASTOLIC BLOOD PRESSURE: 65 MMHG | OXYGEN SATURATION: 100 % | TEMPERATURE: 97.2 F | RESPIRATION RATE: 17 BRPM | BODY MASS INDEX: 25.6 KG/M2 | SYSTOLIC BLOOD PRESSURE: 103 MMHG | WEIGHT: 127 LBS | HEIGHT: 59 IN

## 2025-02-21 DIAGNOSIS — T78.3XXA ANGIOEDEMA DUE TO ANGIOTENSIN CONVERTING ENZYME INHIBITOR (ACE-I): Primary | ICD-10-CM

## 2025-02-21 DIAGNOSIS — T50.905A ADVERSE EFFECT OF DRUG, INITIAL ENCOUNTER: ICD-10-CM

## 2025-02-21 DIAGNOSIS — T46.4X5A ANGIOEDEMA DUE TO ANGIOTENSIN CONVERTING ENZYME INHIBITOR (ACE-I): Primary | ICD-10-CM

## 2025-02-21 PROCEDURE — 99214 OFFICE O/P EST MOD 30 MIN: CPT

## 2025-02-21 RX ORDER — LOSARTAN POTASSIUM 50 MG/1
50 TABLET ORAL DAILY
Qty: 30 TABLET | Refills: 0 | Status: SHIPPED | OUTPATIENT
Start: 2025-02-21

## 2025-02-21 RX ORDER — METHYLPREDNISOLONE 4 MG/1
TABLET ORAL
Qty: 1 KIT | Refills: 0 | Status: SHIPPED | OUTPATIENT
Start: 2025-02-21 | End: 2025-02-27

## 2025-02-21 ASSESSMENT — PATIENT HEALTH QUESTIONNAIRE - PHQ9
2. FEELING DOWN, DEPRESSED OR HOPELESS: NOT AT ALL
1. LITTLE INTEREST OR PLEASURE IN DOING THINGS: NOT AT ALL
SUM OF ALL RESPONSES TO PHQ QUESTIONS 1-9: 0
SUM OF ALL RESPONSES TO PHQ9 QUESTIONS 1 & 2: 0
SUM OF ALL RESPONSES TO PHQ QUESTIONS 1-9: 0

## 2025-02-21 NOTE — PROGRESS NOTES
[facility] Medicine Residency Program  [address] [place] [address]   Office [phone], Fax [phone]      Chief Complaint:     Chief Complaint   Patient presents with    Swelling     Lips        Subjective:   HPI:  Crystal Carrillo is a 74 y.o. female that presents to clinic for:    Lip swelling:   Patient was seen on 2/17/2025 for similar concerns.  At that time there is no evidence of angioedema on physical exam.  Patient was having no difficulty with respirations.  Diagnosed with paresthesias.   Since last being seen lip swelling has increased.   Isolated to upper lip.  Denies any tongue swelling or difficulty breathing.  Similar experience with taking spironolactone.   Took Zyrtec with minimal relief.     HTN:   At last visit  over 75 mmHg per patient she had forgotten her medications for a few days.  She was given BP kit to measure BP at home.   -Since last visit she has not been monitoring her BP at home.   - BP in office today improved.   No headaches, vision changes, chest pain, or any other concerns at this time.       ROS:   Negative other than mentioned in HPI      Please note that this dictation was completed with [employer], the computer voice recognition software.  Quite often unanticipated grammatical, syntax, homophones, and other interpretive errors are inadvertently transcribed by the computer software.  Please disregard these errors.  Please excuse any errors that have escaped final proofreading.     Past medical history, social history, medications, and allergies personally reviewed.  Past Medical History:   Diagnosis Date    Cerebral artery occlusion with cerebral infarction (HCC)     Diabetes mellitus (HCC)     Hypertension      Social History     Socioeconomic History    Marital status:      Spouse name: Not on file    Number of children: Not on file    Years of education: Not on file    Highest education level: Not on file   Occupational History

## 2025-02-21 NOTE — PROGRESS NOTES
I saw and evaluated the patient, performing the key elements of the service. I discussed the findings, assessment and plan with the resident and agree with the resident's findings and plan as documented in the resident's note.

## 2025-02-21 NOTE — PROGRESS NOTES
Roomed by name and .    Chief Complaint   Patient presents with    Swelling     Lips         Vitals:    25 1334   BP: 103/65   Pulse: 76   Resp: 17   Temp: 97.2 °F (36.2 °C)   TempSrc: Temporal   SpO2: 100%   Weight: 57.6 kg (127 lb)   Height: 1.499 m (4' 11\")          \"Have you been to the ER, urgent care clinic since your last visit?  Hospitalized since your last visit?\"    NO    “Have you seen or consulted any other health care providers outside of  since your last visit?”    NO                     Click Here for Release of Records Request

## 2025-02-21 NOTE — PATIENT INSTRUCTIONS
STOP TAKING YOUR LISINOPRIL.   Start taking Losartan 50mg. Schedule follow up appointment on 2/24 or 2/25 to follow up facial swelling and blood pressure.   If you experience worsening swelling, swelling of the tongue, or any difficulty breathing please go to the Emergency Room ASAP.

## 2025-02-25 ENCOUNTER — OFFICE VISIT (OUTPATIENT)
Age: 75
End: 2025-02-25
Payer: MEDICARE

## 2025-02-25 VITALS
HEART RATE: 68 BPM | RESPIRATION RATE: 18 BRPM | BODY MASS INDEX: 25.6 KG/M2 | TEMPERATURE: 97.3 F | SYSTOLIC BLOOD PRESSURE: 159 MMHG | OXYGEN SATURATION: 97 % | HEIGHT: 59 IN | WEIGHT: 127 LBS | DIASTOLIC BLOOD PRESSURE: 73 MMHG

## 2025-02-25 DIAGNOSIS — E11.59 HYPERTENSION ASSOCIATED WITH DIABETES (HCC): Primary | ICD-10-CM

## 2025-02-25 DIAGNOSIS — I15.2 HYPERTENSION ASSOCIATED WITH DIABETES (HCC): Primary | ICD-10-CM

## 2025-02-25 PROCEDURE — 99214 OFFICE O/P EST MOD 30 MIN: CPT

## 2025-02-25 RX ORDER — HYDROCHLOROTHIAZIDE 12.5 MG/1
12.5 TABLET ORAL EVERY MORNING
Qty: 30 TABLET | Refills: 1 | Status: SHIPPED | OUTPATIENT
Start: 2025-02-25

## 2025-02-25 ASSESSMENT — PATIENT HEALTH QUESTIONNAIRE - PHQ9
SUM OF ALL RESPONSES TO PHQ QUESTIONS 1-9: 0
1. LITTLE INTEREST OR PLEASURE IN DOING THINGS: NOT AT ALL
SUM OF ALL RESPONSES TO PHQ9 QUESTIONS 1 & 2: 0
SUM OF ALL RESPONSES TO PHQ QUESTIONS 1-9: 0
2. FEELING DOWN, DEPRESSED OR HOPELESS: NOT AT ALL

## 2025-02-25 NOTE — PROGRESS NOTES
Roomed by name and .    Chief Complaint   Patient presents with    Follow-up        Vitals:    25 1602   BP: (!) 159/73   Pulse: 68   Resp: 18   Temp: 97.3 °F (36.3 °C)   TempSrc: Temporal   SpO2: 97%   Weight: 57.6 kg (127 lb)   Height: 1.499 m (4' 11\")          \"Have you been to the ER, urgent care clinic since your last visit?  Hospitalized since your last visit?\"    NO    “Have you seen or consulted any other health care providers outside of [facility] since your last visit?”    NO                     Click Here for Release of Records Request

## 2025-02-27 NOTE — PROGRESS NOTES
[facility] Medicine Residency Program  [address] [place] [address]   Office [phone], Fax [phone]      Chief Complaint:     Chief Complaint   Patient presents with    Follow-up       Subjective:   HPI:  Crystal Carrillo is a 74 y.o. female that presents to clinic for:    Angioedema secondary to ACE inhibitor:  Patient seen 2/21/2025 due to angioedema thought to be secondary to ACE inhibitor.  At that time patient was instructed to discontinue lisinopril started on losartan.  Additionally patient was given prescription for Medrol Dosepak.  Patient has 3 days left of Medrol Dosepak.  Patient reports since last being seen swelling of her upper lip has resolved.  However she still reports paresthesias of the upper lip started yesterday.  This symptom occurred prior to onset of angioedema last time.  Patient denies any chest pain, shortness of breath, swelling of her tongue, numbness/tingling/weakness of upper or lower extremities.  Denies any other focal deficits.  Patient with no additional concerns at this time.    ROS:   Negative other than mentioned in HPI      Please note that this dictation was completed with [employer], the computer voice recognition software.  Quite often unanticipated grammatical, syntax, homophones, and other interpretive errors are inadvertently transcribed by the computer software.  Please disregard these errors.  Please excuse any errors that have escaped final proofreading.     Past medical history, social history, medications, and allergies personally reviewed.  Past Medical History:   Diagnosis Date    Cerebral artery occlusion with cerebral infarction (HCC)     Diabetes mellitus (HCC)     Hypertension      Social History     Socioeconomic History    Marital status:      Spouse name: Not on file    Number of children: Not on file    Years of education: Not on file    Highest education level: Not on file   Occupational History    Not on file

## 2025-03-03 ENCOUNTER — TELEPHONE (OUTPATIENT)
Age: 75
End: 2025-03-03

## 2025-03-03 NOTE — TELEPHONE ENCOUNTER
Pt calling for refill for amLODIPine (NORVASC) 10 MG tablet. After explaining to the pt that she should have 4 refills at her [facility] on [address], pt to call pharmacy to check.

## 2025-03-11 ENCOUNTER — OFFICE VISIT (OUTPATIENT)
Age: 75
End: 2025-03-11

## 2025-03-11 VITALS
DIASTOLIC BLOOD PRESSURE: 62 MMHG | HEART RATE: 77 BPM | RESPIRATION RATE: 18 BRPM | TEMPERATURE: 99.7 F | SYSTOLIC BLOOD PRESSURE: 136 MMHG | HEIGHT: 59 IN | WEIGHT: 128 LBS | BODY MASS INDEX: 25.8 KG/M2 | OXYGEN SATURATION: 94 %

## 2025-03-11 DIAGNOSIS — Z00.00 INITIAL MEDICARE ANNUAL WELLNESS VISIT: Primary | ICD-10-CM

## 2025-03-11 DIAGNOSIS — I10 PRIMARY HYPERTENSION: ICD-10-CM

## 2025-03-11 DIAGNOSIS — E78.5 HYPERLIPIDEMIA ASSOCIATED WITH TYPE 2 DIABETES MELLITUS: ICD-10-CM

## 2025-03-11 DIAGNOSIS — E11.69 HYPERLIPIDEMIA ASSOCIATED WITH TYPE 2 DIABETES MELLITUS: ICD-10-CM

## 2025-03-11 DIAGNOSIS — Z79.4 TYPE 2 DIABETES MELLITUS WITH DIABETIC POLYNEUROPATHY, WITH LONG-TERM CURRENT USE OF INSULIN (HCC): ICD-10-CM

## 2025-03-11 DIAGNOSIS — E11.42 TYPE 2 DIABETES MELLITUS WITH DIABETIC POLYNEUROPATHY, WITH LONG-TERM CURRENT USE OF INSULIN (HCC): ICD-10-CM

## 2025-03-11 RX ORDER — HYDROCHLOROTHIAZIDE 12.5 MG/1
CAPSULE ORAL
COMMUNITY

## 2025-03-11 ASSESSMENT — PATIENT HEALTH QUESTIONNAIRE - PHQ9
SUM OF ALL RESPONSES TO PHQ QUESTIONS 1-9: 0
SUM OF ALL RESPONSES TO PHQ QUESTIONS 1-9: 0
1. LITTLE INTEREST OR PLEASURE IN DOING THINGS: NOT AT ALL
SUM OF ALL RESPONSES TO PHQ QUESTIONS 1-9: 0
2. FEELING DOWN, DEPRESSED OR HOPELESS: NOT AT ALL
SUM OF ALL RESPONSES TO PHQ QUESTIONS 1-9: 0

## 2025-03-11 NOTE — PATIENT INSTRUCTIONS
and call your doctor if you are having problems. It's also a good idea to know your test results and keep a list of the medicines you take.  What should you include in an advance directive?  Many states have a unique advance directive form. (It may ask you to address specific issues.) Or you might use a universal form that's approved by many states.  If your form doesn't tell you what to address, it may be hard to know what to include in your advance directive. Use the questions below to help you get started.  Who do you want to make decisions about your medical care if you are not able to?  What life-support measures do you want if you have a serious illness that gets worse over time or can't be cured?  What are you most afraid of that might happen? (Maybe you're afraid of having pain, losing your independence, or being kept alive by machines.)  Where would you prefer to die? (Your home? A hospital? A nursing home?)  Do you want to donate your organs when you die?  Do you want certain Adventism practices performed before you die?  When should you call for help?  Be sure to contact your doctor if you have any questions.  Where can you learn more?  Go to https://www.[employer].net/patientEd and enter R264 to learn more about \"Advance Directives: Care Instructions.\"  Current as of: November 16, 2023  Content Version: 14.3  © 2024 [employer].   Care instructions adapted under license by [employer]. If you have questions about a medical condition or this instruction, always ask your healthcare professional. [employer], disclaims any warranty or liability for your use of this information.         A Healthy Heart: Care Instructions  Overview     Coronary artery disease, also called heart disease, occurs when a substance called plaque builds up in the vessels that supply oxygen-rich blood to your heart muscle. This can narrow the blood vessels and reduce blood flow. A heart attack happens when

## 2025-03-12 ENCOUNTER — RESULTS FOLLOW-UP (OUTPATIENT)
Age: 75
End: 2025-03-12

## 2025-03-12 LAB
ALBUMIN SERPL-MCNC: 3.9 G/DL (ref 3.5–5)
ALBUMIN/GLOB SERPL: 1.1 (ref 1.1–2.2)
ALP SERPL-CCNC: 75 U/L (ref 45–117)
ALT SERPL-CCNC: 26 U/L (ref 12–78)
ANION GAP SERPL CALC-SCNC: 5 MMOL/L (ref 2–12)
AST SERPL-CCNC: 24 U/L (ref 15–37)
BASOPHILS # BLD: 0.05 K/UL (ref 0–0.1)
BASOPHILS NFR BLD: 0.6 % (ref 0–1)
BILIRUB SERPL-MCNC: 0.5 MG/DL (ref 0.2–1)
BUN SERPL-MCNC: 13 MG/DL (ref 6–20)
BUN/CREAT SERPL: 13 (ref 12–20)
CALCIUM SERPL-MCNC: 9.8 MG/DL (ref 8.5–10.1)
CHLORIDE SERPL-SCNC: 107 MMOL/L (ref 97–108)
CHOLEST SERPL-MCNC: 160 MG/DL
CO2 SERPL-SCNC: 28 MMOL/L (ref 21–32)
CREAT SERPL-MCNC: 1.04 MG/DL (ref 0.55–1.02)
CREAT UR-MCNC: 157 MG/DL
DIFFERENTIAL METHOD BLD: NORMAL
EOSINOPHIL # BLD: 0.07 K/UL (ref 0–0.4)
EOSINOPHIL NFR BLD: 0.8 % (ref 0–7)
ERYTHROCYTE [DISTWIDTH] IN BLOOD BY AUTOMATED COUNT: 14.2 % (ref 11.5–14.5)
EST. AVERAGE GLUCOSE BLD GHB EST-MCNC: 160 MG/DL
GLOBULIN SER CALC-MCNC: 3.4 G/DL (ref 2–4)
GLUCOSE SERPL-MCNC: 102 MG/DL (ref 65–100)
HBA1C MFR BLD: 7.2 % (ref 4–5.6)
HCT VFR BLD AUTO: 42.4 % (ref 35–47)
HDLC SERPL-MCNC: 93 MG/DL
HDLC SERPL: 1.7 (ref 0–5)
HGB BLD-MCNC: 13.5 G/DL (ref 11.5–16)
IMM GRANULOCYTES # BLD AUTO: 0.01 K/UL (ref 0–0.04)
IMM GRANULOCYTES NFR BLD AUTO: 0.1 % (ref 0–0.5)
LDLC SERPL CALC-MCNC: 50.6 MG/DL (ref 0–100)
LYMPHOCYTES # BLD: 1.54 K/UL (ref 0.8–3.5)
LYMPHOCYTES NFR BLD: 18.3 % (ref 12–49)
MCH RBC QN AUTO: 29.2 PG (ref 26–34)
MCHC RBC AUTO-ENTMCNC: 31.8 G/DL (ref 30–36.5)
MCV RBC AUTO: 91.6 FL (ref 80–99)
MICROALBUMIN UR-MCNC: 7.57 MG/DL
MICROALBUMIN/CREAT UR-RTO: 48 MG/G (ref 0–30)
MONOCYTES # BLD: 0.47 K/UL (ref 0–1)
MONOCYTES NFR BLD: 5.6 % (ref 5–13)
NEUTS SEG # BLD: 6.28 K/UL (ref 1.8–8)
NEUTS SEG NFR BLD: 74.6 % (ref 32–75)
NRBC # BLD: 0 K/UL (ref 0–0.01)
NRBC BLD-RTO: 0 PER 100 WBC
PLATELET # BLD AUTO: 297 K/UL (ref 150–400)
PMV BLD AUTO: 9.3 FL (ref 8.9–12.9)
POTASSIUM SERPL-SCNC: 4.2 MMOL/L (ref 3.5–5.1)
PROT SERPL-MCNC: 7.3 G/DL (ref 6.4–8.2)
RBC # BLD AUTO: 4.63 M/UL (ref 3.8–5.2)
SODIUM SERPL-SCNC: 140 MMOL/L (ref 136–145)
TRIGL SERPL-MCNC: 82 MG/DL
VLDLC SERPL CALC-MCNC: 16.4 MG/DL
WBC # BLD AUTO: 8.4 K/UL (ref 3.6–11)

## 2025-03-21 NOTE — PROGRESS NOTES
I discussed the findings, assessment and plan with the resident and agree with the resident's findings and plan as documented in the resident's note.  
Patient has been identified by name and .    Chief Complaint   Patient presents with    Medicare AWV     Pt reports for [facility]     Pt refused to fill in PhQ-9    Vitals:    25 1426 25 1439   BP: (!) 153/70 (!) 154/71   BP Site: Right Upper Arm Right Upper Arm   Patient Position: Sitting Sitting   BP Cuff Size: Medium Adult Medium Adult   Pulse: 73 77   Resp: 18    Temp: 99.7 °F (37.6 °C)    TempSrc: Oral    SpO2: 94%    Weight: 58.1 kg (128 lb)    Height: 1.499 m (4' 11\")         \"Have you been to the ER, urgent care clinic since your last visit?  Hospitalized since your last visit?\"    NO    “Have you seen or consulted any other health care providers outside of [facility] since your last visit?”    NO       Have you had a mammogram?”   NO    No breast cancer screening on file            
This encounter was created in error - please disregard.  
MD Miguel   Insulin Glargine, 2 Unit Dial, (TOUJEO MAX SOLOSTAR) 300 UNIT/ML SOPN Inject 40 Units into the skin daily Yes Tammy Sifuentes MD   glucagon 1 MG injection Inject 1 mg into the muscle See Admin Instructions Follow package directions for low blood sugar. Yes Tammy Sifuentes MD   Nutritional Supplements (GLUCOSE MANAGEMENT) TABS Take 1 tablet by mouth as needed (low blood sugar) Yes Tammy Sifuentes MD   buPROPion (WELLBUTRIN XL) 300 MG extended release tablet 300 MG  , TAKE 1 TABLET DAILY IN THE MORNING Yes Miguel Mccallum MD   clopidogrel (PLAVIX) 75 MG tablet Take by mouth Yes Miguel Mccallum MD   insulin lispro, 1 Unit Dial, (HUMALOG KWIKPEN) 100 UNIT/ML SOPN Inject into the skin 3-8 units per sliding scale Yes Miguel Mccallum MD   empagliflozin (JARDIANCE) 25 MG tablet Take 1 tablet by mouth Yes Miguel Mccallum MD   Semaglutide (OZEMPIC, 1 MG/DOSE, SC) Inject into the skin Yes Miguel Mccallum MD   Cholecalciferol (VITAMIN D3) 25 MCG (1000 UT) CHEW Take by mouth Yes Miguel Mccallum MD   Blood Pressure KIT 1 Box by Does not apply route daily  Patient not taking: Reported on 3/11/2025  Fern Villegas MD   blood glucose test strips (ASCENSIA AUTODISC VI;ONE TOUCH ULTRA TEST VI) strip 1 each by In Vitro route daily As needed.  Patient not taking: Reported on 3/11/2025  Miguel Mccallum MD   ONE TOUCH CLUB LANCETS MISC Not Applicable  TEST THREE TIMES A DAY, as directed  Patient not taking: Reported on 3/11/2025  Miguel Mccallum MD       CareTeam (Including outside providers/suppliers regularly involved in providing care):   Patient Care Team:  Tammy Sifuentes MD as PCP - General (Family Medicine)     Recommendations for Preventive Services Due: see orders and patient instructions/AVS.  Recommended screening schedule for the next 5-10 years is provided to the patient in written form: see Patient Instructions/AVS.     Reviewed and updated this

## 2025-04-21 ENCOUNTER — HOSPITAL ENCOUNTER (OUTPATIENT)
Facility: HOSPITAL | Age: 75
Setting detail: OUTPATIENT SURGERY
Discharge: HOME | End: 2025-04-21
Attending: INTERNAL MEDICINE | Admitting: INTERNAL MEDICINE
Payer: MEDICARE

## 2025-04-21 ENCOUNTER — ANESTHESIA EVENT (OUTPATIENT)
Facility: HOSPITAL | Age: 75
End: 2025-04-21
Payer: MEDICARE

## 2025-04-21 ENCOUNTER — ANESTHESIA (OUTPATIENT)
Facility: HOSPITAL | Age: 75
End: 2025-04-21
Payer: MEDICARE

## 2025-04-21 VITALS
BODY MASS INDEX: 26.42 KG/M2 | TEMPERATURE: 98.5 F | WEIGHT: 125.88 LBS | HEART RATE: 63 BPM | HEIGHT: 58 IN | RESPIRATION RATE: 17 BRPM | SYSTOLIC BLOOD PRESSURE: 148 MMHG | OXYGEN SATURATION: 99 % | DIASTOLIC BLOOD PRESSURE: 54 MMHG

## 2025-04-21 LAB
GLUCOSE BLD STRIP.AUTO-MCNC: 107 MG/DL (ref 65–117)
SERVICE CMNT-IMP: NORMAL

## 2025-04-21 PROCEDURE — 3600007502: Performed by: INTERNAL MEDICINE

## 2025-04-21 PROCEDURE — 3700000000 HC ANESTHESIA ATTENDED CARE: Performed by: INTERNAL MEDICINE

## 2025-04-21 PROCEDURE — 3700000001 HC ADD 15 MINUTES (ANESTHESIA): Performed by: INTERNAL MEDICINE

## 2025-04-21 PROCEDURE — 7100000011 HC PHASE II RECOVERY - ADDTL 15 MIN: Performed by: INTERNAL MEDICINE

## 2025-04-21 PROCEDURE — 82962 GLUCOSE BLOOD TEST: CPT

## 2025-04-21 PROCEDURE — 6360000002 HC RX W HCPCS: Performed by: NURSE ANESTHETIST, CERTIFIED REGISTERED

## 2025-04-21 PROCEDURE — 3600007512: Performed by: INTERNAL MEDICINE

## 2025-04-21 PROCEDURE — 2580000003 HC RX 258: Performed by: INTERNAL MEDICINE

## 2025-04-21 PROCEDURE — 7100000010 HC PHASE II RECOVERY - FIRST 15 MIN: Performed by: INTERNAL MEDICINE

## 2025-04-21 PROCEDURE — 2709999900 HC NON-CHARGEABLE SUPPLY: Performed by: INTERNAL MEDICINE

## 2025-04-21 PROCEDURE — 88305 TISSUE EXAM BY PATHOLOGIST: CPT

## 2025-04-21 RX ORDER — SODIUM CHLORIDE 0.9 % (FLUSH) 0.9 %
5-40 SYRINGE (ML) INJECTION PRN
Status: DISCONTINUED | OUTPATIENT
Start: 2025-04-21 | End: 2025-04-21 | Stop reason: HOSPADM

## 2025-04-21 RX ORDER — SODIUM CHLORIDE 9 MG/ML
INJECTION, SOLUTION INTRAVENOUS PRN
Status: CANCELLED | OUTPATIENT
Start: 2025-04-21

## 2025-04-21 RX ORDER — PROPOFOL 10 MG/ML
INJECTION, EMULSION INTRAVENOUS
Status: DISCONTINUED | OUTPATIENT
Start: 2025-04-21 | End: 2025-04-21 | Stop reason: SDUPTHER

## 2025-04-21 RX ORDER — SODIUM CHLORIDE 0.9 % (FLUSH) 0.9 %
5-40 SYRINGE (ML) INJECTION PRN
Status: CANCELLED | OUTPATIENT
Start: 2025-04-21

## 2025-04-21 RX ORDER — ONDANSETRON 4 MG/1
4 TABLET, ORALLY DISINTEGRATING ORAL EVERY 8 HOURS PRN
Status: CANCELLED | OUTPATIENT
Start: 2025-04-21

## 2025-04-21 RX ORDER — SODIUM CHLORIDE 0.9 % (FLUSH) 0.9 %
5-40 SYRINGE (ML) INJECTION EVERY 12 HOURS SCHEDULED
Status: DISCONTINUED | OUTPATIENT
Start: 2025-04-21 | End: 2025-04-21 | Stop reason: HOSPADM

## 2025-04-21 RX ORDER — ONDANSETRON 2 MG/ML
4 INJECTION INTRAMUSCULAR; INTRAVENOUS EVERY 6 HOURS PRN
Status: CANCELLED | OUTPATIENT
Start: 2025-04-21

## 2025-04-21 RX ORDER — SODIUM CHLORIDE 9 MG/ML
25 INJECTION, SOLUTION INTRAVENOUS PRN
Status: DISCONTINUED | OUTPATIENT
Start: 2025-04-21 | End: 2025-04-21 | Stop reason: HOSPADM

## 2025-04-21 RX ORDER — SODIUM CHLORIDE 0.9 % (FLUSH) 0.9 %
5-40 SYRINGE (ML) INJECTION EVERY 12 HOURS SCHEDULED
Status: CANCELLED | OUTPATIENT
Start: 2025-04-21

## 2025-04-21 RX ADMIN — PROPOFOL 50 MG: 10 INJECTION, EMULSION INTRAVENOUS at 10:15

## 2025-04-21 RX ADMIN — PROPOFOL 150 MCG/KG/MIN: 10 INJECTION, EMULSION INTRAVENOUS at 10:14

## 2025-04-21 RX ADMIN — SODIUM CHLORIDE: 9 INJECTION, SOLUTION INTRAVENOUS at 10:14

## 2025-04-21 RX ADMIN — PROPOFOL 50 MG: 10 INJECTION, EMULSION INTRAVENOUS at 10:16

## 2025-04-21 ASSESSMENT — PAIN - FUNCTIONAL ASSESSMENT: PAIN_FUNCTIONAL_ASSESSMENT: 0-10

## 2025-04-21 NOTE — ANESTHESIA PRE PROCEDURE
Department of Anesthesiology  Preprocedure Note       Name:  Crystal Carrillo   Age:  74 y.o.  :  1950                                          MRN:  [id number]         Date:  2025      Surgeon: Surgeon(s):  Pollo Ellison MD    Procedure: Procedure(s):  COLONOSCOPY    Medications prior to admission:   Prior to Admission medications    Medication Sig Start Date End Date Taking? Authorizing Provider   Continuous Glucose Sensor (FREESTYLE JONEL 3 PLUS SENSOR) MISC by Does not apply route   Yes Miguel Mccallum MD   Blood Pressure KIT 1 Box by Does not apply route daily 25  Yes Fern Villegas MD   ONE TOUCH CLUB LANCETS MISC  14  Yes Miguel Mccallum MD   hydroCHLOROthiazide 12.5 MG tablet Take 1 tablet by mouth every morning 25   Lennie Castrejon DO   amLODIPine (NORVASC) 10 MG tablet Take 1 tablet by mouth daily 24   Tammy Sifuentes MD   atorvastatin (LIPITOR) 80 MG tablet Take 1 tablet by mouth daily 24   Tammy Sifuentes MD   Glucose (TRUEPLUS) 15 GM/32ML GEL Take 32 mLs by mouth as needed (for low blood sugar) 24   Tammy Sifuentes MD   DULoxetine (CYMBALTA) 60 MG extended release capsule 60 MG  , TAKE 1 CAPSULE DAILY 24   Tammy Sifuentes MD   DULoxetine (CYMBALTA) 30 MG extended release capsule Take 1 capsule by mouth daily 24   Tammy Sifuentes MD   gabapentin (NEURONTIN) 100 MG capsule Take 1 capsule by mouth at bedtime.    Miguel Mccallum MD   Insulin Glargine, 2 Unit Dial, (TOUJEO MAX SOLOSTAR) 300 UNIT/ML SOPN Inject 40 Units into the skin daily 4/10/24   Tammy Sifuentes MD   glucagon 1 MG injection Inject 1 mg into the muscle See Admin Instructions Follow package directions for low blood sugar. 4/10/24   Tammy Sifuentes MD   Nutritional Supplements (GLUCOSE MANAGEMENT) TABS Take 1 tablet by mouth as needed (low blood sugar) 4/10/24   Tammy Sifuentes MD   buPROPion (WELLBUTRIN XL) 300 MG extended release tablet 300 MG  , TAKE 1

## 2025-04-21 NOTE — DISCHARGE INSTRUCTIONS
.                       [facility]  [facility]  Pollo Urena MD  [phone]      April 21, 2025    Crystal Carrillo  YOB: 1950    COLONOSCOPY DISCHARGE INSTRUCTIONS    If there is redness at IV site you should apply warm compress to area.  If redness or soreness persist contact Dr. Urena's or your primary care doctor.    There may be a slight amount of blood passed from the rectum.  Gaseous discomfort may develop, but walking, belching will help relieve this.  You may not operate a vehicle for 12 hours  You may not operate machinery or dangerous appliances for rest of today  You may not drink alcoholic beverages for 12 hours  Avoid making any critical decisions for 24 hours    DIET:  You may resume your normal diet, but some patients find that heavy or large meals may lead to indigestion or vomiting.  I suggest a light meal as first food intake.    MEDICATIONS:  The use of some over-the-counter pain medication may lead to bleeding after colon biopsies or polyp removal.  Tylenol (also called acetaminophen) is safe to take even if you have had colonoscopy with polyp removal.    Remember that Tylenol (also called acetaminophen) is safe to take after colonoscopy even if you have had biopsies or polyps removed.    ACTIVITY:  You may resume your normal household activities, but it is recommended that you spend the remainder of the day resting -  avoid any strenuous activity.    CALL DR. URENA'S OFFICE IF:  Increasing pain, nausea, vomiting  Abdominal distension (swelling)  Significant new or increased bleeding (oral or rectal)  Fever/Chills  Chest pain/shortness of breath                       Additional instructions:   Colonoscopy with fair prep, 5mm polyp removed from transverse colon     Recommendations:   Repeat colonoscopy in 3 years time for colon polyp surveillance given small colon polyp removed and fair bowel prep  Resume preprocedure medications

## 2025-04-21 NOTE — OP NOTE
.                       [facility]  [facility]  Pollo Ellison MD  [phone]      2025    Colonoscopy Procedure Note  Crystal Carrillo  :  1950  [facility] Medical Record Number: [id number]    Indications:   history of precancerous colon polyps   PCP:  Tammy Sifuentes MD  Anesthesia/Sedation: Monitored anesthesia care, see separate note  Endoscopist:  Pollo Ellison MD   Complications:  None  Estimated Blood Loss:  None    Permit:  The indications, risks, benefits and alternatives were reviewed with the patient or their decision maker who was provided an opportunity to ask questions and all questions were answered.  The specific risks of colonoscopy with conscious sedation were reviewed, including but not limited to anesthetic complication, bleeding, adverse drug reaction, missed lesion, infection, IV site reactions, and intestinal perforation which would lead to the need for surgical repair.  Alternatives to colonoscopy including radiographic imaging, observation without testing, or laboratory testing were reviewed including the limitations of those alternatives.  After considering the options and having all their questions answered, the patient or their decision maker provided both verbal and written consent to proceed.        Procedure in Detail:  After obtaining informed consent, positioning of the patient in the left lateral decubitus position, and conduction of a pre-procedure pause or \"time out\" the endoscope was introduced into the anus and advanced to the cecum.  The quality of the colonic preparation was fair.  A careful inspection was made as the colonoscope was withdrawn, findings and interventions are described below.  After cleaning the colon, the Marianna Bowel Prep score: 6 R2/T2/L2    Findings:   ESTHER- normal   Cecum, colon, rectum- 5mm sessile polyp removed with cold snare   Anal canal- normal appearing     Specimens:    1.

## 2025-04-21 NOTE — ANESTHESIA POSTPROCEDURE EVALUATION
Department of Anesthesiology  Postprocedure Note    Patient: Crystal Carrillo  MRN: [id number]  YOB: 1950  Date of evaluation: 4/21/2025    Procedure Summary       Date: 04/21/25 Room / Location: [place] [address] / [facility] ENDOSCOPY    Anesthesia Start: 1012 Anesthesia Stop: 1034    Procedures:       COLONOSCOPY (Lower GI Region)      COLONOSCOPY POLYPECTOMY SNARE/BIOPSY (Lower GI Region) Diagnosis:       Hx of colonic polyps      (Hx of colonic polyps [Z86.0100])    Surgeons: Pollo Ellison MD Responsible Provider: Yamilet Hayward MD    Anesthesia Type: TIVA ASA Status: 3            Anesthesia Type: TIVA    Sami Phase I: Sami Score: 9    Sami Phase II: Sami Score: 10    Anesthesia Post Evaluation    Patient location during evaluation: PACU  Patient participation: complete - patient participated  Level of consciousness: awake  Airway patency: patent  Nausea & Vomiting: no vomiting and no nausea  Cardiovascular status: hemodynamically stable  Respiratory status: acceptable  Hydration status: stable  Pain management: adequate    No notable events documented.

## 2025-04-21 NOTE — H&P
.Pre-Endoscopy H&P Update  Chief complaint/HPI/ROS:  The indication for the procedure, the patient's history and the patient's current medications are reviewed prior to the procedure and that data is reported on the H&P to which this document is attached.  Any significant complaints with regard to organ systems will be noted, and if not mentioned then a review of systems is not contributory.  Past Medical History:   Diagnosis Date    Anxiety     Cerebral artery occlusion with cerebral infarction (HCC)     Depression     Diabetes mellitus (HCC)     Diabetic neuropathy (HCC)     Hyperlipidemia     Hypertension     Left-sided sensory deficit present       Past Surgical History:   Procedure Laterality Date     SECTION      COLONOSCOPY N/A 2024    COLONOSCOPY performed by Pollo Ellison MD at [facility] ENDOSCOPY    COLONOSCOPY N/A 2024    COLONOSCOPY POLYPECTOMY SNARE/COLD BIOPSY performed by Pollo Ellison MD at [facility] ENDOSCOPY    FINGER TRIGGER RELEASE Left     PARTIAL HYSTERECTOMY (CERVIX NOT REMOVED)       Social   Social History     Tobacco Use    Smoking status: Never    Smokeless tobacco: Never   Substance Use Topics    Alcohol use: Never      History reviewed. No pertinent family history.   Allergies   Allergen Reactions    Spironolactone Swelling     Mouth swelling    Lisinopril Angioedema    Elemental Sulfur Rash      Prior to Admission Medications   Prescriptions Last Dose Informant Patient Reported? Taking?   Blood Pressure KIT Past Week  No Yes   Si Box by Does not apply route daily   Cholecalciferol (VITAMIN D3) 25 MCG (1000 UT) CHEW 2025  Yes No   Sig: Take by mouth   Continuous Glucose Sensor (FREESTYLE JONEL 3 PLUS SENSOR) MISC 2025 Morning  Yes Yes   Sig: by Does not apply route   DULoxetine (CYMBALTA) 30 MG extended release capsule 2025  No No   Sig: Take 1 capsule by mouth daily   DULoxetine (CYMBALTA) 60 MG extended release capsule 2025  No No   Si MG  , TAKE 1

## 2025-04-21 NOTE — PROGRESS NOTES
Initial RN admission and assessment performed and documented in Endoscopy navigator.     Patient evaluated by anesthesia in pre-procedure holding.     All procedural vital signs, airway assessment, and level of consciousness information monitored and recorded by anesthesia staff on the anesthesia record.     Report received from CRNA post procedure.  Patient transported to recovery area by RN.    Endoscopy post procedure time out was performed and specimens were verified by physician.    Endoscope was pre-cleaned at bedside immediately following procedure by Cameron Vital.

## 2025-05-02 DIAGNOSIS — E11.59 HYPERTENSION ASSOCIATED WITH DIABETES (HCC): ICD-10-CM

## 2025-05-02 DIAGNOSIS — I15.2 HYPERTENSION ASSOCIATED WITH DIABETES (HCC): ICD-10-CM

## 2025-05-02 RX ORDER — HYDROCHLOROTHIAZIDE 12.5 MG/1
12.5 TABLET ORAL EVERY MORNING
Qty: 90 TABLET | Refills: 1 | Status: SHIPPED | OUTPATIENT
Start: 2025-05-02

## 2025-05-29 DIAGNOSIS — E11.42 TYPE 2 DIABETES MELLITUS WITH DIABETIC POLYNEUROPATHY, WITH LONG-TERM CURRENT USE OF INSULIN (HCC): Primary | ICD-10-CM

## 2025-05-29 DIAGNOSIS — Z79.4 TYPE 2 DIABETES MELLITUS WITH DIABETIC POLYNEUROPATHY, WITH LONG-TERM CURRENT USE OF INSULIN (HCC): Primary | ICD-10-CM

## 2025-05-29 NOTE — TELEPHONE ENCOUNTER
Medication Refill Request    Crystal Carrillo is requesting a refill of the following medication(s):   Requested Prescriptions     Pending Prescriptions Disp Refills    gabapentin (NEURONTIN) 100 MG capsule 90 capsule      Sig: Take 1 capsule by mouth at bedtime. Max Daily Amount: 100 mg        Listed PCP is Tammy Sifuentes MD   Last provider to prescribe medication: [employer]  Last Date of Medication Prescribed: No data   [facility]     FUTURE APPOINTMENT: No future appointments.    Please send refill to:    [facility]/pharm[facility] #[id number] - [place], [place] - [address] - P [phone] - F [phone]  [address] [address]  Phone: [phone] Fax: [phone]    [facility] - [place] Stewart - [address] [facility] - P [phone] - F [phone]  [facility]  [place] [address]  Phone: [phone] Fax: [phone]      Please review request and approve or deny with recommendations.

## 2025-05-29 NOTE — TELEPHONE ENCOUNTER
Hi ,    Patient is requesting a 3 month of gabapentin (NEURONTIN) 100 MG capsule     Please send to:    [facility] - [place] JOHNSON - [address] [facility] - P [phone] - F [phone] [23]

## 2025-05-30 RX ORDER — GABAPENTIN 100 MG/1
100 CAPSULE ORAL NIGHTLY
Qty: 90 CAPSULE | Refills: 0 | Status: SHIPPED | OUTPATIENT
Start: 2025-05-30 | End: 2025-08-28

## (undated) DEVICE — 3M™ CUROS™ DISINFECTING CAP FOR NEEDLELESS CONNECTORS 270/CARTON 20 CARTONS/CASE CFF1-270: Brand: CUROS™

## (undated) DEVICE — SNARE ENDOSCP L240CM OD24MM LOOP W10MM RND INSUL STIFF BRAID

## (undated) DEVICE — CONTAINER SPEC 20 ML LID NEUT BUFF FORMALIN 10 % POLYPR STS

## (undated) DEVICE — SET GRAV CK VLV NEEDLESS ST 3 GANGED 4WAY STPCOCK HI FLO 10